# Patient Record
Sex: MALE | Race: WHITE | NOT HISPANIC OR LATINO | Employment: FULL TIME | URBAN - METROPOLITAN AREA
[De-identification: names, ages, dates, MRNs, and addresses within clinical notes are randomized per-mention and may not be internally consistent; named-entity substitution may affect disease eponyms.]

---

## 2017-03-21 ENCOUNTER — ALLSCRIPTS OFFICE VISIT (OUTPATIENT)
Dept: OTHER | Facility: OTHER | Age: 37
End: 2017-03-21

## 2017-08-09 ENCOUNTER — TRANSCRIBE ORDERS (OUTPATIENT)
Dept: ADMINISTRATIVE | Facility: HOSPITAL | Age: 37
End: 2017-08-09

## 2017-08-09 ENCOUNTER — APPOINTMENT (OUTPATIENT)
Dept: LAB | Facility: HOSPITAL | Age: 37
End: 2017-08-09
Payer: COMMERCIAL

## 2017-08-09 DIAGNOSIS — Z00.8 HEALTH EXAMINATION IN POPULATION SURVEY: ICD-10-CM

## 2017-08-09 DIAGNOSIS — Z00.8 HEALTH EXAMINATION IN POPULATION SURVEY: Primary | ICD-10-CM

## 2017-08-09 LAB
CHOLEST SERPL-MCNC: 201 MG/DL (ref 50–200)
EST. AVERAGE GLUCOSE BLD GHB EST-MCNC: 117 MG/DL
HBA1C MFR BLD: 5.7 % (ref 4.2–6.3)
HDLC SERPL-MCNC: 60 MG/DL (ref 40–60)
LDLC SERPL CALC-MCNC: 132 MG/DL (ref 0–100)
TRIGL SERPL-MCNC: 44 MG/DL

## 2017-08-09 PROCEDURE — 80061 LIPID PANEL: CPT

## 2017-08-09 PROCEDURE — 36415 COLL VENOUS BLD VENIPUNCTURE: CPT

## 2017-08-09 PROCEDURE — 83036 HEMOGLOBIN GLYCOSYLATED A1C: CPT

## 2017-11-08 ENCOUNTER — ALLSCRIPTS OFFICE VISIT (OUTPATIENT)
Dept: OTHER | Facility: OTHER | Age: 37
End: 2017-11-08

## 2018-01-14 VITALS
DIASTOLIC BLOOD PRESSURE: 80 MMHG | HEIGHT: 74 IN | WEIGHT: 200 LBS | BODY MASS INDEX: 25.67 KG/M2 | SYSTOLIC BLOOD PRESSURE: 132 MMHG

## 2018-01-18 NOTE — PROGRESS NOTES
Assessment    1  Encounter for preventive health examination (V70 0) (Z00 00)   2  Kidney cysts (753 10) (D35 7)    Plan  Folliculitis    · Mupirocin Calcium 2 % External Cream; APPLY THIN LAYER TO AFFECTED  AREA(S) 3 TIMES DAILY   · Sulfamethoxazole-Trimethoprim 800-160 MG Oral Tablet (Bactrim DS); TAKE 1  TABLET TWICE DAILY    Discussion/Summary  Currently, he eats a healthy diet and has an adequate exercise regimen  Prostate cancer screening: PSA is not indicated  Testicular cancer screening: the risks and benefits of testicular cancer screening were discussed and self testicular exam technique was taught  Colorectal cancer screening: colorectal cancer screening is not indicated  Screening lab work includes kenyetta completed  The risks and benefits of immunizations were discussed  He was advised to be evaluated by a dentist  Advice and education were given regarding aerobic exercise, seat belt use and advanced directive planning  Patient discussion: discussed with the patient  Current on screens  The treatment plan was reviewed with the patient/guardian  The patient/guardian understands and agrees with the treatment plan      Chief Complaint  Health maintenance      History of Present Illness  HM, Adult Male: The patient is being seen for a health maintenance evaluation  Social History: Household members include spouse  He is   Work status: working full time and occupation: Nurse  The patient is a former cigarette smoker and quit smoking 12/2015  He reports never drinking alcohol  He has never used illicit drugs  General Health: The patient's health since the last visit is described as good  He has regular dental visits  He denies vision problems  He denies hearing loss  Immunizations status: up to date  Lifestyle:  He consumes a diverse and healthy diet  He does not have any weight concerns  He exercises regularly  He does not use tobacco  He denies alcohol use  He denies drug use  Reproductive health:  the patient is sexually active  He denies erectile dysfunction  Screening: Active Problems    1  Acne (706 1) (L70 9)   2  Kidney cysts (753 10) (N28 1)   3  Liver hemangioma (228 04) (D18 03)   4  Nicotine dependence (305 1) (F17 200)   5  Periorbital dermatitis (692 9) (L30 9)    Past Medical History    · History of Alcohol Abuse - In Remission (305 03)    Family History  Mother    · No pertinent family history    Social History    · Never a smoker    Current Meds   1  Minocycline HCl - 100 MG Oral Capsule; TAKE 1 CAPSULE DAILY WITH FOOD; Therapy: [id number] to (Evaluate:23Apr2017)  Requested for: 26Oct2016; Last   Rx:25Oct2016 Ordered    Allergies    1  [address] [place] [address]   Recorded: [id number] 07:17AM   Heart Rate 72   Respiration 16   Systolic 502   Diastolic 72   Height 6 ft 2 in   Weight 200 lb    BMI Calculated 25 68   BSA Calculated 2 17     Physical Exam    Constitutional   General appearance: No acute distress, well appearing and well nourished  Head and Face   Head and face: Normal     Palpation of the face and sinuses: No sinus tenderness  Eyes   Pupils and irises: Equal, round, reactive to light  Ears, Nose, Mouth, and Throat   Nasal mucosa, septum, and turbinates: Normal without edema or erythema  Lips, teeth, and gums: Normal, good dentition  Oropharynx: Normal with no erythema, edema, exudate or lesions  Neck   Neck: Supple, symmetric, trachea midline, no masses  Thyroid: Normal, no thyromegaly  Pulmonary   Respiratory effort: No increased work of breathing or signs of respiratory distress  Auscultation of lungs: Clear to auscultation  Cardiovascular   Auscultation of heart: Normal rate and rhythm, normal S1 and S2, no murmurs  Carotid pulses: 2+ bilaterally  Lymphatic   Palpation of lymph nodes in neck: No lymphadenopathy      Psychiatric   Orientation to person, place and time: Normal     Mood and affect: Normal  Results/Data  PHQ-2 Adult Depression Screening [id number] 07:25AM UserRenato     Test Name Result Flag Reference   PHQ-2 Adult Depression Score 0     Over the last two weeks, how often have you been bothered by any of the following problems? Little interest or pleasure in doing things: Not at all - 0  Feeling down, depressed, or hopeless: Not at all - 0   PHQ-2 Adult Depression Screening Negative       (1) LIPID PANEL, FASTING 09Aug2017 02:17PM Xin Sorensen     Test Name Result Flag Reference   CHOLESTEROL 201 mg/dL H    HDL,DIRECT 60 mg/dL  40-60   Specimen collection should occur prior to Metamizole administration due to the potential for falsley depressed results  LDL CHOLESTEROL CALCULATED 132 mg/dL H 0-100   This is a fasting blood test  Water,black tea or black  coffee only after 9:00pm the night before test  Drink 2 glasses of water the morning of test         Triglyceride:        Normal ??? ??? ??? ??? ??? ??? ??? <150 mg/dl   ??? ??? ???Borderline High ??? ??? 150-199 mg/dl   ??? ??? ? ?? High ??? ??? ??? ??? ??? ??? ??? 200-499 mg/dl   ??? ??? ? ??Very High ??? ??? ??? ??? ??? >499 mg/dl      Cholesterol:       Desirable ??? ??? ??? ??? <200 mg/dl   ??? ??? Borderline High ??? 200-239 mg/dl   ??? ??? High ??? ??? ??? ??? ??? ??? >239 mg/dl      HDL Cholesterol:       High ??? ???>59 mg/dL   ??? ??? Low ??? ??? <41 mg/dL      This screening LDL is a calculated result  It does not have the accuracy of the Direct Measured LDL in the monitoring of patients with hyperlipidemia and/or statin therapy  Direct Measure LDL (XDA642) must be ordered separately in these patients  TRIGLYCERIDES 44 mg/dL  <=150   Specimen collection should occur prior to N-Acetylcysteine or Metamizole administration due to the potential for falsely depressed results  (1) HEMOGLOBIN A1C 09Aug2017 02:17PM Xin Sorensen     Test Name Result Flag Reference   HEMOGLOBIN A1C 5 7 %  4 2-6 3   EST  AVG   GLUCOSE 117 mg/dl Signatures   Electronically signed by : Brooks Springer MD; Nov 8 2017  9:06AM EST                       (Author)

## 2018-01-22 VITALS
DIASTOLIC BLOOD PRESSURE: 72 MMHG | HEART RATE: 72 BPM | HEIGHT: 74 IN | RESPIRATION RATE: 16 BRPM | BODY MASS INDEX: 25.67 KG/M2 | WEIGHT: 200 LBS | SYSTOLIC BLOOD PRESSURE: 128 MMHG

## 2018-06-26 DIAGNOSIS — Z00.00 ENCOUNTER FOR WELLNESS EXAMINATION IN ADULT: Primary | ICD-10-CM

## 2018-06-26 DIAGNOSIS — D18.03 HEMANGIOMA OF LIVER: ICD-10-CM

## 2018-07-02 ENCOUNTER — OFFICE VISIT (OUTPATIENT)
Dept: FAMILY MEDICINE CLINIC | Facility: CLINIC | Age: 38
End: 2018-07-02
Payer: COMMERCIAL

## 2018-07-02 DIAGNOSIS — H60.00: Primary | ICD-10-CM

## 2018-07-02 PROCEDURE — 10060 I&D ABSCESS SIMPLE/SINGLE: CPT | Performed by: FAMILY MEDICINE

## 2018-07-02 PROCEDURE — 87070 CULTURE OTHR SPECIMN AEROBIC: CPT | Performed by: FAMILY MEDICINE

## 2018-07-02 PROCEDURE — 87205 SMEAR GRAM STAIN: CPT | Performed by: FAMILY MEDICINE

## 2018-07-03 ENCOUNTER — TELEPHONE (OUTPATIENT)
Dept: FAMILY MEDICINE CLINIC | Facility: CLINIC | Age: 38
End: 2018-07-03

## 2018-07-03 ENCOUNTER — LAB (OUTPATIENT)
Dept: LAB | Facility: HOSPITAL | Age: 38
End: 2018-07-03
Payer: COMMERCIAL

## 2018-07-03 DIAGNOSIS — Z00.00 ENCOUNTER FOR WELLNESS EXAMINATION IN ADULT: ICD-10-CM

## 2018-07-03 DIAGNOSIS — D18.03 HEMANGIOMA OF LIVER: ICD-10-CM

## 2018-07-03 LAB
ALBUMIN SERPL BCP-MCNC: 4.1 G/DL (ref 3.5–5)
ALP SERPL-CCNC: 58 U/L (ref 46–116)
ALT SERPL W P-5'-P-CCNC: 24 U/L (ref 12–78)
ANION GAP SERPL CALCULATED.3IONS-SCNC: 5 MMOL/L (ref 4–13)
AST SERPL W P-5'-P-CCNC: 15 U/L (ref 5–45)
BASOPHILS # BLD AUTO: 0.03 THOUSANDS/ΜL (ref 0–0.1)
BASOPHILS NFR BLD AUTO: 1 % (ref 0–1)
BILIRUB SERPL-MCNC: 0.4 MG/DL (ref 0.2–1)
BUN SERPL-MCNC: 13 MG/DL (ref 5–25)
CALCIUM SERPL-MCNC: 9.4 MG/DL (ref 8.3–10.1)
CHLORIDE SERPL-SCNC: 105 MMOL/L (ref 100–108)
CHOLEST SERPL-MCNC: 206 MG/DL (ref 50–200)
CO2 SERPL-SCNC: 31 MMOL/L (ref 21–32)
CREAT SERPL-MCNC: 1.08 MG/DL (ref 0.6–1.3)
EOSINOPHIL # BLD AUTO: 0.11 THOUSAND/ΜL (ref 0–0.61)
EOSINOPHIL NFR BLD AUTO: 2 % (ref 0–6)
ERYTHROCYTE [DISTWIDTH] IN BLOOD BY AUTOMATED COUNT: 12.7 % (ref 11.6–15.1)
EST. AVERAGE GLUCOSE BLD GHB EST-MCNC: 111 MG/DL
GFR SERPL CREATININE-BSD FRML MDRD: 88 ML/MIN/1.73SQ M
GLUCOSE P FAST SERPL-MCNC: 86 MG/DL (ref 65–99)
HBA1C MFR BLD: 5.5 % (ref 4.2–6.3)
HCT VFR BLD AUTO: 41 % (ref 36.5–49.3)
HDLC SERPL-MCNC: 65 MG/DL (ref 40–60)
HGB BLD-MCNC: 13.6 G/DL (ref 12–17)
IMM GRANULOCYTES # BLD AUTO: 0.01 THOUSAND/UL (ref 0–0.2)
IMM GRANULOCYTES NFR BLD AUTO: 0 % (ref 0–2)
LDLC SERPL CALC-MCNC: 133 MG/DL (ref 0–100)
LYMPHOCYTES # BLD AUTO: 2.39 THOUSANDS/ΜL (ref 0.6–4.47)
LYMPHOCYTES NFR BLD AUTO: 46 % (ref 14–44)
MCH RBC QN AUTO: 29.8 PG (ref 26.8–34.3)
MCHC RBC AUTO-ENTMCNC: 33.2 G/DL (ref 31.4–37.4)
MCV RBC AUTO: 90 FL (ref 82–98)
MONOCYTES # BLD AUTO: 0.3 THOUSAND/ΜL (ref 0.17–1.22)
MONOCYTES NFR BLD AUTO: 6 % (ref 4–12)
NEUTROPHILS # BLD AUTO: 2.38 THOUSANDS/ΜL (ref 1.85–7.62)
NEUTS SEG NFR BLD AUTO: 45 % (ref 43–75)
NONHDLC SERPL-MCNC: 141 MG/DL
NRBC BLD AUTO-RTO: 0 /100 WBCS
PLATELET # BLD AUTO: 260 THOUSANDS/UL (ref 149–390)
PMV BLD AUTO: 9.3 FL (ref 8.9–12.7)
POTASSIUM SERPL-SCNC: 4.2 MMOL/L (ref 3.5–5.3)
PROT SERPL-MCNC: 7.4 G/DL (ref 6.4–8.2)
RBC # BLD AUTO: 4.57 MILLION/UL (ref 3.88–5.62)
SODIUM SERPL-SCNC: 141 MMOL/L (ref 136–145)
TRIGL SERPL-MCNC: 39 MG/DL
WBC # BLD AUTO: 5.22 THOUSAND/UL (ref 4.31–10.16)

## 2018-07-03 PROCEDURE — 83036 HEMOGLOBIN GLYCOSYLATED A1C: CPT

## 2018-07-03 PROCEDURE — 85025 COMPLETE CBC W/AUTO DIFF WBC: CPT

## 2018-07-03 PROCEDURE — 80053 COMPREHEN METABOLIC PANEL: CPT

## 2018-07-03 PROCEDURE — 80061 LIPID PANEL: CPT

## 2018-07-03 PROCEDURE — 36415 COLL VENOUS BLD VENIPUNCTURE: CPT

## 2018-07-04 LAB
BACTERIA WND AEROBE CULT: NORMAL
GRAM STN SPEC: NORMAL
GRAM STN SPEC: NORMAL

## 2018-07-05 ENCOUNTER — TELEPHONE (OUTPATIENT)
Dept: FAMILY MEDICINE CLINIC | Facility: CLINIC | Age: 38
End: 2018-07-05

## 2018-07-05 NOTE — TELEPHONE ENCOUNTER
Patient notified as per Dr Casie Hoover  [place] states he is doing awesome, healing up nicely! Advised to call office if any reccurence        ----- Message from Magda Merino MD sent at 7/5/2018  6:34 AM EDT -----  No evid MRSA on C/S---ck progress of ear abscess

## 2018-07-12 ENCOUNTER — OFFICE VISIT (OUTPATIENT)
Dept: FAMILY MEDICINE CLINIC | Facility: CLINIC | Age: 38
End: 2018-07-12
Payer: COMMERCIAL

## 2018-07-12 VITALS
BODY MASS INDEX: 23.87 KG/M2 | SYSTOLIC BLOOD PRESSURE: 116 MMHG | WEIGHT: 186 LBS | HEIGHT: 74 IN | HEART RATE: 76 BPM | OXYGEN SATURATION: 98 % | DIASTOLIC BLOOD PRESSURE: 70 MMHG

## 2018-07-12 DIAGNOSIS — L73.9 FOLLICULITIS: Primary | ICD-10-CM

## 2018-07-12 PROCEDURE — 99213 OFFICE O/P EST LOW 20 MIN: CPT | Performed by: FAMILY MEDICINE

## 2018-07-12 RX ORDER — MUPIROCIN CALCIUM 20 MG/G
CREAM TOPICAL 3 TIMES DAILY
COMMUNITY
Start: 2017-11-08 | End: 2019-01-17 | Stop reason: ALTCHOICE

## 2018-07-12 RX ORDER — MINOCYCLINE HYDROCHLORIDE 100 MG/1
1 CAPSULE ORAL DAILY
COMMUNITY
Start: 2015-11-05 | End: 2018-07-12 | Stop reason: HOSPADM

## 2018-07-12 RX ORDER — SULFAMETHOXAZOLE AND TRIMETHOPRIM 800; 160 MG/1; MG/1
1 TABLET ORAL EVERY 12 HOURS SCHEDULED
COMMUNITY
End: 2022-03-21 | Stop reason: SDUPTHER

## 2018-07-12 NOTE — PROGRESS NOTES
[address] [facility]         NAME: Sarah Franco is a 41 y o  male  : 1980    MRN: [id number]  DATE: 2018  TIME: 10:26 AM    Assessment and Plan   Folliculitis [G07 1]  1  Folliculitis           Patient Instructions     Patient Instructions   Cont same meds          Chief Complaint     Chief Complaint   Patient presents with    [facility] wellness         History of Present Illness       F/u ear abscess--stable        Review of Systems   Review of Systems   Constitutional: Negative for appetite change, chills, diaphoresis and fever  HENT: Negative for ear pain, rhinorrhea, sinus pressure and sore throat  Eyes: Negative for discharge, redness and itching  Respiratory: Negative for cough, shortness of breath and wheezing  Cardiovascular: Negative for chest pain and palpitations  Gastrointestinal: Negative for abdominal pain, diarrhea, nausea and vomiting  Current Medications       Current Outpatient Prescriptions:     mupirocin (BACTROBAN) 2 % cream, Apply topically 3 (three) times a day, Disp: , Rfl:     sulfamethoxazole-trimethoprim (BACTRIM DS) 800-160 mg per tablet, Take 1 tablet by mouth every 12 (twelve) hours, Disp: , Rfl:     Creatine POWD, Take 5 g by mouth daily  , Disp: , Rfl:     Multiple Vitamins-Minerals (MULTIVITAMIN WITH MINERALS) tablet, Take 1 tablet by mouth daily  , Disp: , Rfl:     Current Allergies     Allergies as of 2018 - Reviewed 2018   Allergen Reaction Noted    Ceclor [cefaclor] Anaphylaxis 2016    Cephalosporins Anaphylaxis 2016    Shellfish-derived products  2016            The following portions of the patient's history were reviewed and updated as appropriate: allergies, current medications, past family history, past medical history, past social history, past surgical history and problem list      Past Medical History:   Diagnosis Date    Alcohol abuse, in remission Onset: 18May 2009  No past surgical history on file  Family History   Problem Relation Age of Onset    No Known Problems Mother          Medications have been verified  Objective   /70   Pulse 76   Ht 6' 2" (1 88 m)   Wt 84 4 kg (186 lb)   SpO2 98%   BMI 23 88 kg/m²        Physical Exam     Physical Exam   Constitutional: He appears well-developed and well-nourished  No distress  HENT:   Right Ear: Tympanic membrane, external ear and ear canal normal  Tympanic membrane is not injected  Left Ear: Tympanic membrane, external ear and ear canal normal  Tympanic membrane is not injected  Nose: Nose normal    Mouth/Throat: Uvula is midline, oropharynx is clear and moist and mucous membranes are normal    Eyes: Conjunctivae are normal  Pupils are equal, round, and reactive to light  Neck: Normal range of motion  Neck supple  No thyromegaly present  Cardiovascular: Normal rate, regular rhythm and normal heart sounds  No murmur heard  Pulmonary/Chest: Effort normal and breath sounds normal  No respiratory distress  He has no wheezes  Lymphadenopathy:     He has no cervical adenopathy  Skin: He is not diaphoretic

## 2018-07-20 ENCOUNTER — OFFICE VISIT (OUTPATIENT)
Dept: FAMILY MEDICINE CLINIC | Facility: CLINIC | Age: 38
End: 2018-07-20
Payer: COMMERCIAL

## 2018-07-20 VITALS
SYSTOLIC BLOOD PRESSURE: 116 MMHG | BODY MASS INDEX: 23.87 KG/M2 | DIASTOLIC BLOOD PRESSURE: 74 MMHG | WEIGHT: 186 LBS | HEART RATE: 77 BPM | OXYGEN SATURATION: 98 % | HEIGHT: 74 IN

## 2018-07-20 DIAGNOSIS — H61.23 CERUMEN DEBRIS ON TYMPANIC MEMBRANE OF BOTH EARS: Primary | ICD-10-CM

## 2018-07-20 DIAGNOSIS — H60.551 ACUTE REACTIVE OTITIS EXTERNA OF RIGHT EAR: ICD-10-CM

## 2018-07-20 PROCEDURE — 3008F BODY MASS INDEX DOCD: CPT | Performed by: FAMILY MEDICINE

## 2018-07-20 PROCEDURE — 69209 REMOVE IMPACTED EAR WAX UNI: CPT | Performed by: FAMILY MEDICINE

## 2018-07-20 PROCEDURE — 99213 OFFICE O/P EST LOW 20 MIN: CPT | Performed by: FAMILY MEDICINE

## 2018-07-20 PROCEDURE — 1036F TOBACCO NON-USER: CPT | Performed by: FAMILY MEDICINE

## 2018-07-20 NOTE — PROGRESS NOTES
[address] [facility]         NAME: Michelle Austin is a 41 y o  male  : 1980    MRN: [id number]  DATE: 2018  TIME: 9:33 AM    Assessment and Plan   Cerumen debris on tympanic membrane of both ears [H61 23]  1  Cerumen debris on tympanic membrane of both ears     2  Acute reactive otitis externa of right ear           Patient Instructions     Patient Instructions   Avoid Q-tip use in the area  With the wax out the year should be less itchy  Recheck as needed for periodic wax removal   Home remedies were discussed  flushing-wax removed with irrigation without incident  Tympanic membranes were intact  Mild canal inflammation  Chief Complaint     Chief Complaint   Patient presents with    Cerumen Impaction     both ears         History of Present Illness       Patient is a 41year old male who presents c/o R sided ear fullness and worsening of tinnitus x couple of days  Pt reports he has been wearing ear plugs the last few days and thinks that has caused symptoms  Denies Qtip use or other FB aside from ear plugs  He reports some mild left ear fullness as well  Review of Systems   Review of Systems   Constitutional: Negative for chills and fever  HENT: Positive for hearing loss (decreased hearing)  Negative for ear discharge, ear pain, sinus pain and sinus pressure  Current Medications       Current Outpatient Prescriptions:     Creatine POWD, Take 5 g by mouth daily  , Disp: , Rfl:     Multiple Vitamins-Minerals (MULTIVITAMIN WITH MINERALS) tablet, Take 1 tablet by mouth daily  , Disp: , Rfl:     mupirocin (BACTROBAN) 2 % cream, Apply topically 3 (three) times a day, Disp: , Rfl:     sulfamethoxazole-trimethoprim (BACTRIM DS) 800-160 mg per tablet, Take 1 tablet by mouth every 12 (twelve) hours, Disp: , Rfl:     Current Allergies     Allergies as of 2018 - Reviewed 2018   Allergen Reaction Noted    Ceclor [cefaclor] Anaphylaxis 02/05/2016    Cephalosporins Anaphylaxis 02/05/2016    Shellfish-derived products  02/05/2016            The following portions of the patient's history were reviewed and updated as appropriate: allergies, current medications, past family history, past medical history, past social history, past surgical history and problem list      Past Medical History:   Diagnosis Date    Alcohol abuse, in remission     Onset: 18May 2009  History reviewed  No pertinent surgical history  Family History   Problem Relation Age of Onset    No Known Problems Mother          Medications have been verified  Objective   /74   Pulse 77   Ht 6' 2" (1 88 m)   Wt 84 4 kg (186 lb)   SpO2 98%   BMI 23 88 kg/m²        Physical Exam     Physical Exam   Constitutional: He appears well-developed and well-nourished  No distress  HENT:   Head: Normocephalic and atraumatic  Right Ear: External ear normal    Left Ear: External ear normal    Mouth/Throat: Oropharynx is clear and moist    Moderate cerumen impaction noted b/l   Eyes: Pupils are equal, round, and reactive to light  Neck: Normal range of motion  Neck supple

## 2018-07-20 NOTE — PATIENT INSTRUCTIONS
Avoid Q-tip use in the area  With the wax out the year should be less itchy  Recheck as needed for periodic wax removal   Home remedies were discussed

## 2018-11-14 ENCOUNTER — IMMUNIZATION (OUTPATIENT)
Dept: FAMILY MEDICINE CLINIC | Facility: CLINIC | Age: 38
End: 2018-11-14
Payer: COMMERCIAL

## 2018-11-14 DIAGNOSIS — Z23 ENCOUNTER FOR IMMUNIZATION: ICD-10-CM

## 2018-11-14 PROCEDURE — 90686 IIV4 VACC NO PRSV 0.5 ML IM: CPT

## 2018-11-14 PROCEDURE — 90471 IMMUNIZATION ADMIN: CPT

## 2019-01-17 ENCOUNTER — OFFICE VISIT (OUTPATIENT)
Dept: FAMILY MEDICINE CLINIC | Facility: CLINIC | Age: 39
End: 2019-01-17
Payer: COMMERCIAL

## 2019-01-17 VITALS
DIASTOLIC BLOOD PRESSURE: 82 MMHG | BODY MASS INDEX: 23.74 KG/M2 | TEMPERATURE: 98.9 F | HEART RATE: 67 BPM | WEIGHT: 185 LBS | SYSTOLIC BLOOD PRESSURE: 126 MMHG | OXYGEN SATURATION: 98 % | HEIGHT: 74 IN

## 2019-01-17 DIAGNOSIS — J01.00 ACUTE NON-RECURRENT MAXILLARY SINUSITIS: Primary | ICD-10-CM

## 2019-01-17 PROCEDURE — 99213 OFFICE O/P EST LOW 20 MIN: CPT | Performed by: FAMILY MEDICINE

## 2019-01-17 PROCEDURE — 3008F BODY MASS INDEX DOCD: CPT | Performed by: FAMILY MEDICINE

## 2019-01-17 RX ORDER — AZITHROMYCIN 250 MG/1
250 TABLET, FILM COATED ORAL EVERY 24 HOURS
Qty: 6 TABLET | Refills: 0 | Status: SHIPPED | OUTPATIENT
Start: 2019-01-17 | End: 2019-01-22

## 2019-01-19 NOTE — PROGRESS NOTES
[facility]        NAME: Gilles Cruz is a 42 y o  male  : 1980    MRN: [id number]  DATE: 2019  TIME: 8:52 AM    Assessment and Plan   Acute non-recurrent maxillary sinusitis [J01 00]  1  Acute non-recurrent maxillary sinusitis  azithromycin (ZITHROMAX) 250 mg tablet         Patient Instructions     Patient Instructions   z pk if Sx worse          Chief Complaint     Chief Complaint   Patient presents with    Sore Throat     fever, losing voice for 5--6 days         History of Present Illness       C/o ST/myalgias sev days      Sore Throat    Pertinent negatives include no abdominal pain, congestion, diarrhea, shortness of breath or vomiting  Review of Systems   Review of Systems   Constitutional: Positive for chills and fever  Negative for fatigue and unexpected weight change  HENT: Positive for postnasal drip and sore throat  Negative for congestion and sinus pressure  Eyes: Negative for visual disturbance  Respiratory: Negative for shortness of breath and wheezing  Cardiovascular: Negative for chest pain and palpitations  Gastrointestinal: Negative for abdominal pain, diarrhea, nausea and vomiting  Current Medications       Current Outpatient Prescriptions:     Creatine POWD, Take 5 g by mouth daily  , Disp: , Rfl:     Multiple Vitamins-Minerals (MULTIVITAMIN WITH MINERALS) tablet, Take 1 tablet by mouth daily  , Disp: , Rfl:     sulfamethoxazole-trimethoprim (BACTRIM DS) 800-160 mg per tablet, Take 1 tablet by mouth every 12 (twelve) hours, Disp: , Rfl:     azithromycin (ZITHROMAX) 250 mg tablet, Take 1 tablet (250 mg total) by mouth every 24 hours for 5 days 2 tabs Day 1, then 1 tab daily X 4 days, Disp: 6 tablet, Rfl: 0    Current Allergies     Allergies as of 2019 - Reviewed 2019   Allergen Reaction Noted    Ceclor [cefaclor] Anaphylaxis 2016    Cephalosporins Anaphylaxis 2016    Shellfish-derived products  02/05/2016            The following portions of the patient's history were reviewed and updated as appropriate: allergies, current medications, past family history, past medical history, past social history, past surgical history and problem list      Past Medical History:   Diagnosis Date    Alcohol abuse, in remission     Onset: 18May 2009  No past surgical history on file  Family History   Problem Relation Age of Onset    No Known Problems Mother          Medications have been verified  Objective   /82   Pulse 67   Temp 98 9 °F (37 2 °C)   Ht 6' 2" (1 88 m)   Wt 83 9 kg (185 lb)   SpO2 98%   BMI 23 75 kg/m²        Physical Exam     Physical Exam   HENT:   Right Ear: External ear normal    Left Ear: External ear normal    Mouth/Throat: No oropharyngeal exudate  Sinus red/swollen bilat----throat very red--no exudate   Eyes: Conjunctivae are normal    Neck: Normal range of motion  Neck supple  Cardiovascular: Normal heart sounds  Pulmonary/Chest: Breath sounds normal    Lymphadenopathy:     He has no cervical adenopathy

## 2019-07-14 ENCOUNTER — HOSPITAL ENCOUNTER (EMERGENCY)
Facility: HOSPITAL | Age: 39
Discharge: HOME | End: 2019-07-14
Attending: EMERGENCY MEDICINE | Admitting: EMERGENCY MEDICINE
Payer: OTHER MISCELLANEOUS

## 2019-07-14 VITALS
SYSTOLIC BLOOD PRESSURE: 112 MMHG | RESPIRATION RATE: 16 BRPM | WEIGHT: 185 LBS | BODY MASS INDEX: 23.74 KG/M2 | TEMPERATURE: 97.5 F | OXYGEN SATURATION: 99 % | HEART RATE: 66 BPM | HEIGHT: 74 IN | DIASTOLIC BLOOD PRESSURE: 56 MMHG

## 2019-07-14 DIAGNOSIS — S29.012A STRAIN OF THORACIC BACK REGION: Primary | ICD-10-CM

## 2019-07-14 PROCEDURE — 99283 EMERGENCY DEPT VISIT LOW MDM: CPT

## 2019-07-14 PROCEDURE — 99283 EMERGENCY DEPT VISIT LOW MDM: CPT | Performed by: PHYSICIAN ASSISTANT

## 2019-07-14 RX ORDER — METHOCARBAMOL 500 MG/1
500 TABLET, FILM COATED ORAL 3 TIMES DAILY
Qty: 15 TABLET | Refills: 0 | Status: SHIPPED | OUTPATIENT
Start: 2019-07-14

## 2019-07-15 NOTE — ED PROVIDER NOTES
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  The patient was on a backboard and lifted from the floor to a stretcher  When he was putting him on the stretcher the IV pole was in the way and he had to twist to put the patient on the stretcher  He denies numbness, tingling, weakness, bowel/bladder dysfunction  The pain is worse with twisting to the right  He currently has an icy hot patch on his back which seems to help a little, but as the day goes on his pain is worsening  History provided by:  Patient  Back Pain   Location:  Thoracic spine  Quality:  Aching  Radiates to:  Does not radiate  Pain severity:  Moderate  Onset quality:  Gradual  Duration:  2 days  Timing:  Constant  Progression:  Worsening  Chronicity:  New  Context: lifting heavy objects    Relieved by:  Being still  Worsened by: Movement and twisting  Ineffective treatments: icy hot patch  Associated symptoms: no abdominal pain, no abdominal swelling, no bladder incontinence, no bowel incontinence, no chest pain, no fever, no leg pain, no numbness, no perianal numbness and no weakness    Risk factors: no hx of cancer, no hx of osteoporosis and not obese        Prior to Admission Medications   Prescriptions Last Dose Informant Patient Reported? Taking? Creatine POWD Past Week at Unknown time  Yes Yes   Sig: Take 5 g by mouth daily  Multiple Vitamins-Minerals (MULTIVITAMIN WITH MINERALS) tablet Past Week at Unknown time  Yes Yes   Sig: Take 1 tablet by mouth daily     sulfamethoxazole-trimethoprim (BACTRIM DS) 800-160 mg per tablet Past Week at Unknown time  Yes Yes   Sig: Take 1 tablet by mouth every 12 (twelve) hours      Facility-Administered Medications: None       Past Medical History:   Diagnosis Date    Alcohol abuse, in remission     Onset: 18May 2009  History reviewed  No pertinent surgical history  Family History   Problem Relation Age of Onset    No Known Problems Mother      I have reviewed and agree with the history as documented  Social History     Tobacco Use    Smoking status: Former Smoker    Smokeless tobacco: Former User     Quit date: 2/5/2015    Tobacco comment: Per [employer]: Never Smoker   Substance Use Topics    Alcohol use: No     Comment: Per [employer]: Alcohol Abuse in remission - Onset Date [id number]    Drug use: No        Review of Systems   Constitutional: Negative for chills and fever  Cardiovascular: Negative for chest pain  Gastrointestinal: Negative for abdominal pain and bowel incontinence  Genitourinary: Negative for bladder incontinence  Musculoskeletal: Positive for back pain  Skin: Negative for color change and rash  Neurological: Negative for weakness and numbness  All other systems reviewed and are negative  Physical Exam  Physical Exam   Constitutional: He is oriented to person, place, and time  He appears well-developed and well-nourished  He is cooperative  He does not appear ill  No distress  HENT:   Head: Normocephalic and atraumatic  Eyes: Conjunctivae are normal    Neck: Normal range of motion  No spinous process tenderness and no muscular tenderness present  Cardiovascular: Normal rate, regular rhythm and normal heart sounds  Pulmonary/Chest: Effort normal and breath sounds normal    Musculoskeletal:        Thoracic back: He exhibits tenderness (left paraspinal muscles of thoracic spine  )  He exhibits normal range of motion, no bony tenderness, no swelling and no deformity          Back:    Neurological: He is alert and oriented to person, place, and time  He has normal strength  No sensory deficit  Gait normal    Skin: Skin is warm and dry  No rash noted  He is not diaphoretic  No pallor  Nursing note and vitals reviewed  Vital Signs  ED Triage Vitals [07/14/19 2105]   Temperature Pulse Respirations Blood Pressure SpO2   97 5 °F (36 4 °C) 66 16 112/56 99 %      Temp src Heart Rate Source Patient Position - Orthostatic VS BP Location FiO2 (%)   -- Monitor Sitting Left arm --      Pain Score       3           Vitals:    07/14/19 2105   BP: 112/56   Pulse: 66   Patient Position - Orthostatic VS: Sitting         Visual Acuity      ED Medications  Medications - No data to display    Diagnostic Studies  Results Reviewed     None                 No orders to display              Procedures  Procedures       ED Course                               MDM  Number of Diagnoses or Management Options  Strain of thoracic back region: new and does not require workup  Diagnosis management comments: Patient with strain of thoracic back, no radicular symptoms, most likely muscular, advised rest and f/u with occumed for recheck  Patient Progress  Patient progress: stable      Disposition  Final diagnoses:   Strain of thoracic back region     Time reflects when diagnosis was documented in both MDM as applicable and the Disposition within this note     Time User Action Codes Description Comment    7/14/2019  9:29 PM Ivin Doctor Add [S29 012A] Strain of thoracic back region       ED Disposition     ED Disposition Condition Date/Time Comment    Discharge Stable Sun Jul 14, 2019  9:29 PM Onita Face discharge to home/self care              Follow-up Information     Follow up With Specialties Details Why Contact Info Additional Information    [address] Now [facility] In 2 days For recheck [address] [address]  [address] [place], [address]          Discharge Medication List as of 7/14/2019  9:31 PM      START taking these medications    Details   methocarbamol (ROBAXIN) 500 mg tablet Take 1 tablet (500 mg total) by mouth 3 (three) times a day, Starting Sun 7/14/2019, Print         CONTINUE these medications which have NOT CHANGED    Details   Creatine POWD Take 5 g by mouth daily  , Until Discontinued, Historical Med      Multiple Vitamins-Minerals (MULTIVITAMIN WITH MINERALS) tablet Take 1 tablet by mouth daily  , Until Discontinued, Historical Med      sulfamethoxazole-trimethoprim (BACTRIM DS) 800-160 mg per tablet Take 1 tablet by mouth every 12 (twelve) hours, Historical Med           No discharge procedures on file      ED Provider  Electronically Signed by           Cathy Castillo PA-C  07/14/19 3032

## 2019-07-15 NOTE — DISCHARGE INSTRUCTIONS
Rest, ice for next 2 days, heat after 2 days  Take motrin 600mg every 6 hours as needed for pain  Robaxin 3 times a day  Follow up with occpatriciad in 2 days for recheck

## 2019-07-16 ENCOUNTER — APPOINTMENT (OUTPATIENT)
Dept: URGENT CARE | Facility: CLINIC | Age: 39
End: 2019-07-16
Payer: OTHER MISCELLANEOUS

## 2019-07-16 PROCEDURE — 99283 EMERGENCY DEPT VISIT LOW MDM: CPT | Performed by: PHYSICIAN ASSISTANT

## 2019-07-16 PROCEDURE — G0382 LEV 3 HOSP TYPE B ED VISIT: HCPCS | Performed by: PHYSICIAN ASSISTANT

## 2019-11-08 ENCOUNTER — IMMUNIZATIONS (OUTPATIENT)
Dept: FAMILY MEDICINE CLINIC | Facility: CLINIC | Age: 39
End: 2019-11-08
Payer: COMMERCIAL

## 2019-11-08 DIAGNOSIS — Z23 NEED FOR VACCINATION: Primary | ICD-10-CM

## 2019-11-08 PROCEDURE — 90686 IIV4 VACC NO PRSV 0.5 ML IM: CPT

## 2019-11-08 PROCEDURE — 90471 IMMUNIZATION ADMIN: CPT

## 2020-06-26 DIAGNOSIS — Z00.00 ENCOUNTER FOR WELLNESS EXAMINATION IN ADULT: Primary | ICD-10-CM

## 2020-07-03 ENCOUNTER — APPOINTMENT (OUTPATIENT)
Dept: LAB | Facility: HOSPITAL | Age: 40
End: 2020-07-03
Payer: COMMERCIAL

## 2020-07-03 DIAGNOSIS — Z00.00 ENCOUNTER FOR WELLNESS EXAMINATION IN ADULT: ICD-10-CM

## 2020-07-03 LAB
ALBUMIN SERPL BCP-MCNC: 4.1 G/DL (ref 3.5–5)
ALP SERPL-CCNC: 60 U/L (ref 46–116)
ALT SERPL W P-5'-P-CCNC: 24 U/L (ref 12–78)
ANION GAP SERPL CALCULATED.3IONS-SCNC: 5 MMOL/L (ref 4–13)
AST SERPL W P-5'-P-CCNC: 18 U/L (ref 5–45)
BILIRUB SERPL-MCNC: 0.48 MG/DL (ref 0.2–1)
BUN SERPL-MCNC: 18 MG/DL (ref 5–25)
CALCIUM SERPL-MCNC: 9.8 MG/DL (ref 8.3–10.1)
CHLORIDE SERPL-SCNC: 107 MMOL/L (ref 100–108)
CHOLEST SERPL-MCNC: 209 MG/DL (ref 50–200)
CO2 SERPL-SCNC: 28 MMOL/L (ref 21–32)
CREAT SERPL-MCNC: 0.89 MG/DL (ref 0.6–1.3)
EST. AVERAGE GLUCOSE BLD GHB EST-MCNC: 103 MG/DL
GFR SERPL CREATININE-BSD FRML MDRD: 108 ML/MIN/1.73SQ M
GLUCOSE P FAST SERPL-MCNC: 85 MG/DL (ref 65–99)
HBA1C MFR BLD: 5.2 %
HDLC SERPL-MCNC: 63 MG/DL
LDLC SERPL CALC-MCNC: 135 MG/DL (ref 0–100)
NONHDLC SERPL-MCNC: 146 MG/DL
POTASSIUM SERPL-SCNC: 4.1 MMOL/L (ref 3.5–5.3)
PROT SERPL-MCNC: 7.4 G/DL (ref 6.4–8.2)
SODIUM SERPL-SCNC: 140 MMOL/L (ref 136–145)
TRIGL SERPL-MCNC: 55 MG/DL

## 2020-07-03 PROCEDURE — 80053 COMPREHEN METABOLIC PANEL: CPT

## 2020-07-03 PROCEDURE — 36415 COLL VENOUS BLD VENIPUNCTURE: CPT

## 2020-07-03 PROCEDURE — 80061 LIPID PANEL: CPT

## 2020-07-03 PROCEDURE — 83036 HEMOGLOBIN GLYCOSYLATED A1C: CPT

## 2020-07-08 ENCOUNTER — OFFICE VISIT (OUTPATIENT)
Dept: FAMILY MEDICINE CLINIC | Facility: CLINIC | Age: 40
End: 2020-07-08
Payer: COMMERCIAL

## 2020-07-08 VITALS
WEIGHT: 176.4 LBS | DIASTOLIC BLOOD PRESSURE: 76 MMHG | OXYGEN SATURATION: 98 % | HEART RATE: 79 BPM | SYSTOLIC BLOOD PRESSURE: 124 MMHG | BODY MASS INDEX: 22.64 KG/M2 | HEIGHT: 74 IN | TEMPERATURE: 97.1 F

## 2020-07-08 DIAGNOSIS — Z00.00 WELLNESS EXAMINATION: Primary | ICD-10-CM

## 2020-07-08 PROCEDURE — 99395 PREV VISIT EST AGE 18-39: CPT | Performed by: FAMILY MEDICINE

## 2020-07-08 NOTE — PROGRESS NOTES
[facility]        NAME: Tawny Muniz is a 47 y o  male  : 1980    MRN: [id number]  DATE: 2020  TIME: 1:03 PM    Assessment and Plan   Wellness examination [B75 16]  6  Wellness examination           Patient Instructions     Patient Instructions   F/u 1 yr            Chief Complaint     Chief Complaint   Patient presents with    Annual Exam     HM         History of Present Illness       Annual exam      Review of Systems   Review of Systems   Constitutional: Negative for fatigue, fever and unexpected weight change  HENT: Negative for congestion, sinus pain and sore throat  Eyes: Negative for visual disturbance  Respiratory: Negative for shortness of breath and wheezing  Cardiovascular: Negative for chest pain and palpitations  Gastrointestinal: Negative for abdominal pain, nausea and vomiting  Musculoskeletal: Negative  Negative for arthralgias and myalgias  Neurological: Negative for syncope, weakness and numbness  Psychiatric/Behavioral: Negative  Negative for confusion, dysphoric mood and suicidal ideas  Current Medications       Current Outpatient Medications:     Creatine POWD, Take 5 g by mouth daily  , Disp: , Rfl:     methocarbamol (ROBAXIN) 500 mg tablet, Take 1 tablet (500 mg total) by mouth 3 (three) times a day, Disp: 15 tablet, Rfl: 0    Multiple Vitamins-Minerals (MULTIVITAMIN WITH MINERALS) tablet, Take 1 tablet by mouth daily  , Disp: , Rfl:     sulfamethoxazole-trimethoprim (BACTRIM DS) 800-160 mg per tablet, Take 1 tablet by mouth every 12 (twelve) hours, Disp: , Rfl:     Current Allergies     Allergies as of 2020 - Reviewed 2020   Allergen Reaction Noted    Ceclor [cefaclor] Anaphylaxis 2016    Cephalosporins Anaphylaxis 2016    Shellfish-derived products  2016            The following portions of the patient's history were reviewed and updated as appropriate: allergies, current medications, past family history, past medical history, past social history, past surgical history and problem list      Past Medical History:   Diagnosis Date    Alcohol abuse, in remission     Onset: 18May 2009  History reviewed  No pertinent surgical history  Family History   Problem Relation Age of Onset    No Known Problems Mother          Medications have been verified  Objective   /76   Pulse 79   Temp (!) 97 1 °F (36 2 °C)   Ht 6' 2" (1 88 m)   Wt 80 kg (176 lb 6 4 oz)   SpO2 98%   BMI 22 65 kg/m²        Physical Exam     Physical Exam   Constitutional: He is oriented to person, place, and time  Vital signs are normal  He appears well-developed and well-nourished  HENT:   Right Ear: Ear canal normal  Tympanic membrane is not injected  Left Ear: Ear canal normal  Tympanic membrane is not injected  Nose: Nose normal    Mouth/Throat: Oropharynx is clear and moist    Eyes: Pupils are equal, round, and reactive to light  Conjunctivae and EOM are normal  Right eye exhibits no discharge  Left eye exhibits no discharge  Neck: Normal range of motion  Neck supple  No thyromegaly present  Cardiovascular: Normal rate, regular rhythm and normal heart sounds  No murmur heard  Pulmonary/Chest: Effort normal and breath sounds normal  No respiratory distress  He has no wheezes  Abdominal: Soft  Bowel sounds are normal  He exhibits no distension  There is no tenderness  Musculoskeletal: Normal range of motion  Lymphadenopathy:     He has no cervical adenopathy  Neurological: He is alert and oriented to person, place, and time  He has normal strength and normal reflexes  He is not disoriented  No sensory deficit  Gait normal    Skin: Skin is warm and dry  Psychiatric: He has a normal mood and affect   His speech is normal and behavior is normal  Judgment and thought content normal  Cognition and memory are normal

## 2020-11-18 ENCOUNTER — CLINICAL SUPPORT (OUTPATIENT)
Dept: FAMILY MEDICINE CLINIC | Facility: CLINIC | Age: 40
End: 2020-11-18
Payer: COMMERCIAL

## 2020-11-18 DIAGNOSIS — Z23 NEED FOR VACCINATION: Primary | ICD-10-CM

## 2020-11-18 PROCEDURE — 90686 IIV4 VACC NO PRSV 0.5 ML IM: CPT

## 2020-11-18 PROCEDURE — 90471 IMMUNIZATION ADMIN: CPT

## 2021-01-05 ENCOUNTER — IMMUNIZATIONS (OUTPATIENT)
Dept: FAMILY MEDICINE CLINIC | Facility: HOSPITAL | Age: 41
End: 2021-01-05

## 2021-01-05 DIAGNOSIS — Z23 ENCOUNTER FOR IMMUNIZATION: ICD-10-CM

## 2021-01-05 PROCEDURE — 0011A SARS-COV-2 / COVID-19 MRNA VACCINE (MODERNA) 100 MCG: CPT

## 2021-01-05 PROCEDURE — 91301 SARS-COV-2 / COVID-19 MRNA VACCINE (MODERNA) 100 MCG: CPT

## 2021-02-07 ENCOUNTER — IMMUNIZATIONS (OUTPATIENT)
Dept: FAMILY MEDICINE CLINIC | Facility: HOSPITAL | Age: 41
End: 2021-02-07

## 2021-02-07 DIAGNOSIS — Z23 ENCOUNTER FOR IMMUNIZATION: Primary | ICD-10-CM

## 2021-02-07 PROCEDURE — 0012A SARS-COV-2 / COVID-19 MRNA VACCINE (MODERNA) 100 MCG: CPT

## 2021-02-07 PROCEDURE — 91301 SARS-COV-2 / COVID-19 MRNA VACCINE (MODERNA) 100 MCG: CPT

## 2021-06-23 ENCOUNTER — OFFICE VISIT (OUTPATIENT)
Dept: FAMILY MEDICINE CLINIC | Facility: CLINIC | Age: 41
End: 2021-06-23
Payer: COMMERCIAL

## 2021-06-23 VITALS
HEIGHT: 74 IN | OXYGEN SATURATION: 97 % | SYSTOLIC BLOOD PRESSURE: 118 MMHG | RESPIRATION RATE: 16 BRPM | WEIGHT: 172 LBS | DIASTOLIC BLOOD PRESSURE: 62 MMHG | BODY MASS INDEX: 22.07 KG/M2 | HEART RATE: 76 BPM

## 2021-06-23 DIAGNOSIS — S76.212A STRAIN OF LEFT GROIN: Primary | ICD-10-CM

## 2021-06-23 DIAGNOSIS — M54.42 LEFT-SIDED LOW BACK PAIN WITH LEFT-SIDED SCIATICA, UNSPECIFIED CHRONICITY: ICD-10-CM

## 2021-06-23 PROCEDURE — 99213 OFFICE O/P EST LOW 20 MIN: CPT | Performed by: NURSE PRACTITIONER

## 2021-06-23 RX ORDER — PREDNISONE 20 MG/1
TABLET ORAL
Qty: 15 TABLET | Refills: 0 | Status: SHIPPED | OUTPATIENT
Start: 2021-06-23

## 2021-06-23 NOTE — PATIENT INSTRUCTIONS
Prednisone as directed for pain/inflammation  Ibuprofen as directed for pain  No strenuous activity or heavy lifting  Apply ice as directed to decrease pain/inflammation  If no improvement and unable to work Friday call for work note    Call with any problems or concerns

## 2021-06-23 NOTE — PROGRESS NOTES
[facility]        NAME: Andrei Manzo is a 46 y o  male  : 1980    MRN: [id number]  DATE: 2021  TIME: 4:40 PM    Assessment and Plan   Strain of left groin [S76 212A]  1  Strain of left groin     2  Left-sided low back pain with left-sided sciatica, unspecified chronicity  predniSONE 20 mg tablet         Patient Instructions     Patient Instructions   Prednisone as directed for pain/inflammation  Ibuprofen as directed for pain  No strenuous activity or heavy lifting  Apply ice as directed to decrease pain/inflammation  If no improvement and unable to work Friday call for work note  Call with any problems or concerns          Chief Complaint     Chief Complaint   Patient presents with    Hip Pain     L hip pain X few months- worse last few days         History of Present Illness       C/o left low back/sciatic pain for a few months not improving  Pulled muscle in left groin x 2 days possible after walking dog  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  Review of Systems   Review of Systems   Constitutional: Positive for activity change  Negative for fever  Respiratory: Negative for cough, chest tightness and shortness of breath  Cardiovascular: Negative for chest pain  Gastrointestinal: Negative for abdominal pain  Genitourinary: Negative  Negative for difficulty urinating, scrotal swelling and testicular pain  Musculoskeletal: Positive for arthralgias, back pain and myalgias (muscle pull left groin)  Negative for gait problem  Skin: Negative for color change  Neurological: Negative for numbness  Psychiatric/Behavioral: Negative for decreased concentration and dysphoric mood  Current Medications       Current Outpatient Medications:     Multiple Vitamins-Minerals (MULTIVITAMIN WITH MINERALS) tablet, Take 1 tablet by mouth daily  , Disp: , Rfl:     Creatine POWD, Take 5 g by mouth daily   (Patient not taking: Reported on 6/23/2021), Disp: , Rfl:     methocarbamol (ROBAXIN) 500 mg tablet, Take 1 tablet (500 mg total) by mouth 3 (three) times a day (Patient not taking: Reported on 6/23/2021), Disp: 15 tablet, Rfl: 0    predniSONE 20 mg tablet, Take one table twice daily x 5 days, then take one tablet daily x 5 days, Disp: 15 tablet, Rfl: 0    sulfamethoxazole-trimethoprim (BACTRIM DS) 800-160 mg per tablet, Take 1 tablet by mouth every 12 (twelve) hours (Patient not taking: Reported on 6/23/2021), Disp: , Rfl:     Current Allergies     Allergies as of 06/23/2021 - Reviewed 06/23/2021   Allergen Reaction Noted    Ceclor [cefaclor] Anaphylaxis 02/05/2016    Cephalosporins Anaphylaxis 02/05/2016    Shellfish-derived products - food allergy  02/05/2016            The following portions of the patient's history were reviewed and updated as appropriate: allergies, current medications, past family history, past medical history, past social history, past surgical history and problem list      Past Medical History:   Diagnosis Date    Alcohol abuse, in remission     Onset: 18May 2009  History reviewed  No pertinent surgical history  Family History   Problem Relation Age of Onset    No Known Problems Mother          Medications have been verified  Objective   /62   Pulse 76   Resp 16   Ht 6' 2" (1 88 m)   Wt 78 kg (172 lb)   SpO2 97%   BMI 22 08 kg/m²        Physical Exam     Physical Exam  Vitals and nursing note reviewed  Constitutional:       General: He is not in acute distress  Appearance: Normal appearance  He is well-developed  He is not ill-appearing or diaphoretic  HENT:      Head: Normocephalic  Neck:      Thyroid: No thyromegaly  Trachea: No tracheal deviation  Pulmonary:      Effort: Pulmonary effort is normal  No respiratory distress     Musculoskeletal:         General: Tenderness (left low back pain, radiates to left hip  positive tenderness left groin with ROM) present  No swelling, deformity or signs of injury  Normal range of motion  Cervical back: Normal range of motion  Skin:     General: Skin is warm and dry  Neurological:      Mental Status: He is alert and oriented to person, place, and time  Psychiatric:         Mood and Affect: Mood normal          Speech: Speech normal          Behavior: Behavior normal          Thought Content:  Thought content normal          Judgment: Judgment normal            PHQ-9 Depression Screening    PHQ-9:   Frequency of the following problems over the past two weeks:      Little interest or pleasure in doing things: 0 - not at all  Feeling down, depressed, or hopeless: 0 - not at all  PHQ-2 Score: 0

## 2021-06-24 ENCOUNTER — TELEPHONE (OUTPATIENT)
Dept: FAMILY MEDICINE CLINIC | Facility: CLINIC | Age: 41
End: 2021-06-24

## 2021-06-24 NOTE — TELEPHONE ENCOUNTER
----- Message from Maribel Gaspar sent at 6/24/2021  1:44 PM EDT -----  Regarding: Visit Follow-Up Question  Contact: [phone]  [facility], saw [address] Schaefer about left hip pain/strain/injury  She said if I'm not feeling better today to call for work note  Can you supply one? Should be good to go with a couple days, maybe go back Sunday?

## 2021-06-25 ENCOUNTER — PATIENT MESSAGE (OUTPATIENT)
Dept: FAMILY MEDICINE CLINIC | Facility: CLINIC | Age: 41
End: 2021-06-25

## 2021-09-07 ENCOUNTER — LAB (OUTPATIENT)
Dept: LAB | Facility: HOSPITAL | Age: 41
End: 2021-09-07

## 2021-09-07 DIAGNOSIS — Z00.8 HEALTH EXAMINATION IN POPULATION SURVEY: ICD-10-CM

## 2021-09-07 LAB
CHOLEST SERPL-MCNC: 225 MG/DL (ref 50–200)
EST. AVERAGE GLUCOSE BLD GHB EST-MCNC: 108 MG/DL
HBA1C MFR BLD: 5.4 %
HDLC SERPL-MCNC: 80 MG/DL
LDLC SERPL CALC-MCNC: 130 MG/DL (ref 0–100)
NONHDLC SERPL-MCNC: 145 MG/DL
TRIGL SERPL-MCNC: 74 MG/DL

## 2021-09-07 PROCEDURE — 83036 HEMOGLOBIN GLYCOSYLATED A1C: CPT

## 2021-09-07 PROCEDURE — 80061 LIPID PANEL: CPT

## 2021-09-07 PROCEDURE — 36415 COLL VENOUS BLD VENIPUNCTURE: CPT

## 2021-11-22 ENCOUNTER — IMMUNIZATIONS (OUTPATIENT)
Dept: FAMILY MEDICINE CLINIC | Facility: CLINIC | Age: 41
End: 2021-11-22
Payer: COMMERCIAL

## 2021-11-22 DIAGNOSIS — Z23 NEED FOR INFLUENZA VACCINATION: Primary | ICD-10-CM

## 2021-11-22 PROCEDURE — 90686 IIV4 VACC NO PRSV 0.5 ML IM: CPT

## 2021-11-22 PROCEDURE — 90471 IMMUNIZATION ADMIN: CPT

## 2021-11-22 PROCEDURE — 96372 THER/PROPH/DIAG INJ SC/IM: CPT

## 2021-12-03 ENCOUNTER — APPOINTMENT (OUTPATIENT)
Dept: URGENT CARE | Facility: MEDICAL CENTER | Age: 41
End: 2021-12-03

## 2021-12-03 PROCEDURE — 86480 TB TEST CELL IMMUN MEASURE: CPT | Performed by: STUDENT IN AN ORGANIZED HEALTH CARE EDUCATION/TRAINING PROGRAM

## 2022-03-21 DIAGNOSIS — J01.00 ACUTE NON-RECURRENT MAXILLARY SINUSITIS: Primary | ICD-10-CM

## 2022-03-21 RX ORDER — SULFAMETHOXAZOLE AND TRIMETHOPRIM 800; 160 MG/1; MG/1
1 TABLET ORAL EVERY 12 HOURS SCHEDULED
Qty: 20 TABLET | Refills: 3 | Status: SHIPPED | OUTPATIENT
Start: 2022-03-21 | End: 2022-03-31

## 2022-08-11 ENCOUNTER — PATIENT MESSAGE (OUTPATIENT)
Dept: FAMILY MEDICINE CLINIC | Facility: CLINIC | Age: 42
End: 2022-08-11

## 2022-08-11 DIAGNOSIS — L70.0 CYSTIC ACNE: Primary | ICD-10-CM

## 2022-08-11 RX ORDER — SULFAMETHOXAZOLE AND TRIMETHOPRIM 800; 160 MG/1; MG/1
1 TABLET ORAL 2 TIMES DAILY
Qty: 20 TABLET | Refills: 3 | Status: SHIPPED | OUTPATIENT
Start: 2022-08-11 | End: 2022-08-21

## 2022-08-19 ENCOUNTER — PATIENT MESSAGE (OUTPATIENT)
Dept: FAMILY MEDICINE CLINIC | Facility: CLINIC | Age: 42
End: 2022-08-19

## 2022-08-19 DIAGNOSIS — N46.11 INFERTILITY DUE TO OLIGOSPERMIA: Primary | ICD-10-CM

## 2022-08-19 NOTE — PATIENT COMMUNICATION
Pt is aware of TW's advice and order  Pt advised to confirm that lab participated with his insurance, to make an appmt with the lab, and also to ensure of any potential special instructions

## 2022-11-23 ENCOUNTER — PATIENT MESSAGE (OUTPATIENT)
Dept: FAMILY MEDICINE CLINIC | Facility: CLINIC | Age: 42
End: 2022-11-23

## 2022-11-28 LAB
EXTERNAL HIV CONFIRMATION: NORMAL
EXTERNAL HIV SCREEN: NORMAL
HCV AB SER-ACNC: NEGATIVE

## 2023-02-14 ENCOUNTER — OFFICE VISIT (OUTPATIENT)
Dept: FAMILY MEDICINE CLINIC | Facility: CLINIC | Age: 43
End: 2023-02-14

## 2023-02-14 VITALS
WEIGHT: 182 LBS | HEART RATE: 95 BPM | SYSTOLIC BLOOD PRESSURE: 124 MMHG | TEMPERATURE: 97.4 F | OXYGEN SATURATION: 98 % | HEIGHT: 74 IN | BODY MASS INDEX: 23.36 KG/M2 | DIASTOLIC BLOOD PRESSURE: 80 MMHG

## 2023-02-14 DIAGNOSIS — Z00.00 WELLNESS EXAMINATION: Primary | ICD-10-CM

## 2023-02-14 DIAGNOSIS — F90.2 ATTENTION DEFICIT HYPERACTIVITY DISORDER (ADHD), COMBINED TYPE: ICD-10-CM

## 2023-02-14 DIAGNOSIS — N41.0 ACUTE PROSTATITIS: ICD-10-CM

## 2023-02-14 RX ORDER — DEXTROAMPHETAMINE SACCHARATE, AMPHETAMINE ASPARTATE, DEXTROAMPHETAMINE SULFATE AND AMPHETAMINE SULFATE 2.5; 2.5; 2.5; 2.5 MG/1; MG/1; MG/1; MG/1
10 TABLET ORAL
Qty: 60 TABLET | Refills: 0 | Status: SHIPPED | OUTPATIENT
Start: 2023-02-14

## 2023-02-14 RX ORDER — DOXYCYCLINE HYCLATE 100 MG
100 TABLET ORAL 2 TIMES DAILY
Qty: 28 TABLET | Refills: 0 | Status: SHIPPED | OUTPATIENT
Start: 2023-02-14 | End: 2023-02-28

## 2023-02-14 NOTE — PROGRESS NOTES
HPI:  Gurdeep Dobbs is a 41 y o  male here for his yearly health maintenance exam    There is no problem list on file for this patient  Past Medical History:   Diagnosis Date   • Alcohol abuse, in remission     Onset: 18May 2009  1  Advanced Directive: n     2  Durable Power of  for Healthcare: n     3  Social History:           Drug and alcohol History: n                  4  Immunizations up to date: y                 Lifestyle:                           Healthy Diet:y                          Alcohol Use:n                          Tobacco Use:n                          Regular exercise:y                          Weight concerns:n                               5  Over the past 2 weeks, how often have you been bothered by the following:              Little interest or pleasure in doing things:n              Felling down, depressed or hopeless:n       No current outpatient medications on file  No current facility-administered medications for this visit  Allergies   Allergen Reactions   • Ceclor [Cefaclor] Anaphylaxis   • Cephalosporins Anaphylaxis   • Shellfish-Derived Products - Food Allergy      Immunization History   Administered Date(s) Administered   • COVID-19 MODERNA VACC 0 5 ML IM 01/05/2021, 02/07/2021   • Influenza Quadrivalent Preservative Free 3 years and older IM 11/05/2015, 10/26/2017   • Influenza, injectable, quadrivalent, preservative free 0 5 mL 11/14/2018, 11/08/2019, 11/18/2020, 11/22/2021   • Influenza, seasonal, injectable 10/01/2016   • MMR 01/14/2015       Patient Care Team:  Elen Turcios MD as PCP - General    Review of Systems   Constitutional: Negative for fatigue, fever and unexpected weight change  HENT: Negative for congestion, sinus pain and sore throat  Eyes: Negative for visual disturbance  Respiratory: Negative for shortness of breath and wheezing  Cardiovascular: Negative for chest pain and palpitations     Gastrointestinal: Negative for abdominal pain, nausea and vomiting  Musculoskeletal: Negative  Negative for arthralgias and myalgias  Neurological: Negative for syncope, weakness and numbness  Psychiatric/Behavioral: Negative  Negative for confusion, dysphoric mood and suicidal ideas  Physical Exam :  Physical Exam  Constitutional:       Appearance: He is well-developed  HENT:      Right Ear: Ear canal normal  Tympanic membrane is not injected  Left Ear: Ear canal normal  Tympanic membrane is not injected  Nose: Nose normal    Eyes:      General:         Right eye: No discharge  Left eye: No discharge  Conjunctiva/sclera: Conjunctivae normal       Pupils: Pupils are equal, round, and reactive to light  Neck:      Thyroid: No thyromegaly  Cardiovascular:      Rate and Rhythm: Normal rate and regular rhythm  Heart sounds: Normal heart sounds  No murmur heard  Pulmonary:      Effort: Pulmonary effort is normal  No respiratory distress  Breath sounds: Normal breath sounds  No wheezing  Abdominal:      General: Bowel sounds are normal  There is no distension  Palpations: Abdomen is soft  Tenderness: There is no abdominal tenderness  Musculoskeletal:         General: Normal range of motion  Cervical back: Normal range of motion and neck supple  Lymphadenopathy:      Cervical: No cervical adenopathy  Skin:     General: Skin is warm and dry  Neurological:      Mental Status: He is alert and oriented to person, place, and time  He is not disoriented  Sensory: No sensory deficit  Gait: Gait normal       Deep Tendon Reflexes: Reflexes are normal and symmetric  Psychiatric:         Speech: Speech normal          Behavior: Behavior normal          Thought Content: Thought content normal          Judgment: Judgment normal            Assessment and Plan:  1   Wellness examination            Health Maintenance Due   Topic Date Due   • BMI: Adult  Never done   • DTaP,Tdap,and Td Vaccines (1 - Tdap) Never done   • COVID-19 Vaccine (3 - Booster for Moderna series) 04/04/2021   • Influenza Vaccine (1) 09/01/2022

## 2023-05-16 DIAGNOSIS — F90.2 ATTENTION DEFICIT HYPERACTIVITY DISORDER (ADHD), COMBINED TYPE: ICD-10-CM

## 2023-05-16 DIAGNOSIS — Z29.8 NEED FOR MALARIA PROPHYLAXIS: Primary | ICD-10-CM

## 2023-05-16 RX ORDER — DEXTROAMPHETAMINE SACCHARATE, AMPHETAMINE ASPARTATE, DEXTROAMPHETAMINE SULFATE AND AMPHETAMINE SULFATE 2.5; 2.5; 2.5; 2.5 MG/1; MG/1; MG/1; MG/1
10 TABLET ORAL
Qty: 60 TABLET | Refills: 0 | Status: SHIPPED | OUTPATIENT
Start: 2023-05-16

## 2023-05-16 RX ORDER — DOXYCYCLINE HYCLATE 100 MG/1
100 CAPSULE ORAL EVERY 12 HOURS SCHEDULED
Qty: 60 CAPSULE | Refills: 0 | Status: SHIPPED | OUTPATIENT
Start: 2023-05-16 | End: 2023-06-15

## 2023-05-19 ENCOUNTER — CLINICAL SUPPORT (OUTPATIENT)
Dept: FAMILY MEDICINE CLINIC | Facility: CLINIC | Age: 43
End: 2023-05-19

## 2023-05-19 DIAGNOSIS — Z23 NEED FOR VACCINATION: Primary | ICD-10-CM

## 2023-05-30 ENCOUNTER — TELEPHONE (OUTPATIENT)
Dept: FAMILY MEDICINE CLINIC | Facility: CLINIC | Age: 43
End: 2023-05-30

## 2023-05-30 NOTE — TELEPHONE ENCOUNTER
----- Message from Adrianne Hoyt MD sent at 5/29/2023  9:34 AM EDT -----  If fasting labs needs X9s----vckffk 377--sl low but not low enuf for replacement  ----- Message -----  From: Interface, Transcription Incoming  Sent: 5/27/2023   4:37 AM EDT  To: Adrianne Hoyt MD

## 2023-07-19 ENCOUNTER — TELEPHONE (OUTPATIENT)
Dept: FAMILY MEDICINE CLINIC | Facility: CLINIC | Age: 43
End: 2023-07-19

## 2024-01-03 ENCOUNTER — OFFICE VISIT (OUTPATIENT)
Dept: FAMILY MEDICINE CLINIC | Facility: CLINIC | Age: 44
End: 2024-01-03
Payer: COMMERCIAL

## 2024-01-03 VITALS
WEIGHT: 182 LBS | HEART RATE: 95 BPM | DIASTOLIC BLOOD PRESSURE: 78 MMHG | SYSTOLIC BLOOD PRESSURE: 124 MMHG | BODY MASS INDEX: 23.37 KG/M2 | OXYGEN SATURATION: 98 %

## 2024-01-03 DIAGNOSIS — F90.2 ATTENTION DEFICIT HYPERACTIVITY DISORDER (ADHD), COMBINED TYPE: ICD-10-CM

## 2024-01-03 DIAGNOSIS — F32.0 CURRENT MILD EPISODE OF MAJOR DEPRESSIVE DISORDER, UNSPECIFIED WHETHER RECURRENT (HCC): Primary | ICD-10-CM

## 2024-01-03 PROCEDURE — 99214 OFFICE O/P EST MOD 30 MIN: CPT

## 2024-01-03 RX ORDER — FLUOXETINE 10 MG/1
10 CAPSULE ORAL DAILY
Qty: 90 CAPSULE | Refills: 3 | Status: SHIPPED | OUTPATIENT
Start: 2024-01-03

## 2024-01-03 RX ORDER — DEXTROAMPHETAMINE SACCHARATE, AMPHETAMINE ASPARTATE, DEXTROAMPHETAMINE SULFATE AND AMPHETAMINE SULFATE 2.5; 2.5; 2.5; 2.5 MG/1; MG/1; MG/1; MG/1
10 TABLET ORAL
Qty: 60 TABLET | Refills: 0 | Status: SHIPPED | OUTPATIENT
Start: 2024-01-03

## 2024-01-04 PROBLEM — F90.2 ATTENTION DEFICIT HYPERACTIVITY DISORDER (ADHD), COMBINED TYPE: Status: ACTIVE | Noted: 2024-01-04

## 2024-01-04 PROBLEM — F32.0 CURRENT MILD EPISODE OF MAJOR DEPRESSIVE DISORDER (HCC): Status: ACTIVE | Noted: 2024-01-04

## 2024-01-04 NOTE — PROGRESS NOTES
"Name: Gabriel Juarez      : 1980      MRN: [id number]  Encounter Provider: LORETTA Nunes  Encounter Date: 1/3/2024   Encounter department: [facility]    Assessment & Plan     1. Current mild episode of major depressive disorder, unspecified whether recurrent (HCC)  Assessment & Plan:  Reports depression exacerbated by life events. States that he has \"about 5 minutes of peace until it feels like a weighted blanket falls on me.\" States that it is difficult to get out of bed and has been sleeping a lot. Denies SI. Trial prozac-- more energizing SSRI. Follow up in 4-6 weeks.     Orders:  -     FLUoxetine (PROzac) 10 mg capsule; Take 1 capsule (10 mg total) by mouth daily    2. Attention deficit hyperactivity disorder (ADHD), combined type  Assessment & Plan:  Reports that symptoms are well managed with adderall. Mostly utilizes for work.     Orders:  -     amphetamine-dextroamphetamine (ADDERALL, 10MG,) 10 mg tablet; Take 1 tablet (10 mg total) by mouth 2 (two) times a day Max Daily Amount: 20 mg        Depression Screening and Follow-up Plan: Patient was screened for depression during today's encounter. They screened negative with a PHQ-2 score of 0.        Subjective      Depression  This is a new problem. The current episode started more than 1 month ago. The problem occurs constantly. The problem has been gradually worsening. Associated symptoms include fatigue. Pertinent negatives include no abdominal pain, anorexia, arthralgias, change in bowel habit, chest pain, chills, congestion, coughing, diaphoresis, fever, headaches, joint swelling, myalgias, nausea, neck pain, numbness, rash, sore throat, swollen glands, urinary symptoms, vertigo, visual change, vomiting or weakness. The symptoms are aggravated by stress. He has tried nothing for the symptoms.     Review of Systems   Constitutional:  Positive for fatigue. Negative for activity change, chills, " diaphoresis and fever.   HENT:  Negative for congestion, ear pain, rhinorrhea and sore throat.    Eyes:  Negative for pain.   Respiratory:  Negative for cough, shortness of breath and wheezing.    Cardiovascular:  Negative for chest pain and leg swelling.   Gastrointestinal:  Negative for abdominal pain, anorexia, change in bowel habit, diarrhea, nausea and vomiting.   Musculoskeletal:  Negative for arthralgias, joint swelling, myalgias and neck pain.   Skin:  Negative for rash.   Neurological:  Negative for dizziness, vertigo, weakness, numbness and headaches.   Psychiatric/Behavioral:  Positive for decreased concentration, depression and dysphoric mood. Negative for suicidal ideas.    All other systems reviewed and are negative.      No current outpatient medications on file prior to visit.       Objective     /78 (BP Location: Left arm, Patient Position: Sitting, Cuff Size: Standard)   Pulse 95   Wt 82.6 kg (182 lb)   SpO2 98%   BMI 23.37 kg/m²     Physical Exam  Vitals and nursing note reviewed.   Constitutional:       Appearance: Normal appearance.   HENT:      Head: Normocephalic.   Cardiovascular:      Rate and Rhythm: Normal rate and regular rhythm.      Heart sounds: Normal heart sounds.   Pulmonary:      Effort: Pulmonary effort is normal.      Breath sounds: Normal breath sounds.   Abdominal:      General: Bowel sounds are normal.   Skin:     General: Skin is warm and dry.   Neurological:      General: No focal deficit present.      Mental Status: He is alert and oriented to person, place, and time. Mental status is at baseline.   Psychiatric:         Attention and Perception: Attention and perception normal.         Mood and Affect: Mood is depressed. Affect is tearful.         Speech: Speech normal.         Behavior: Behavior normal. Behavior is cooperative.         Thought Content: Thought content normal. Thought content is not paranoid or delusional. Thought content does not include  homicidal or suicidal ideation. Thought content does not include homicidal or suicidal plan.         Cognition and Memory: Cognition and memory normal.         Judgment: Judgment normal.       LORETTA Nunes

## 2024-01-04 NOTE — ASSESSMENT & PLAN NOTE
"Reports depression exacerbated by life events. States that he has \"about 5 minutes of peace until it feels like a weighted blanket falls on me.\" States that it is difficult to get out of bed and has been sleeping a lot. Denies SI. Trial prozac-- more energizing SSRI. Follow up in 4-6 weeks.   "

## 2024-02-05 ENCOUNTER — OFFICE VISIT (OUTPATIENT)
Dept: FAMILY MEDICINE CLINIC | Facility: CLINIC | Age: 44
End: 2024-02-05
Payer: COMMERCIAL

## 2024-02-05 VITALS — BODY MASS INDEX: 23.37 KG/M2 | DIASTOLIC BLOOD PRESSURE: 74 MMHG | SYSTOLIC BLOOD PRESSURE: 118 MMHG | WEIGHT: 182 LBS

## 2024-02-05 DIAGNOSIS — F32.0 CURRENT MILD EPISODE OF MAJOR DEPRESSIVE DISORDER WITHOUT PRIOR EPISODE (HCC): ICD-10-CM

## 2024-02-05 DIAGNOSIS — F10.10 ALCOHOL ABUSE: Primary | ICD-10-CM

## 2024-02-05 DIAGNOSIS — Z76.89 RETURN TO WORK EVALUATION: ICD-10-CM

## 2024-02-05 PROCEDURE — 99214 OFFICE O/P EST MOD 30 MIN: CPT | Performed by: FAMILY MEDICINE

## 2024-02-05 RX ORDER — BUPROPION HYDROCHLORIDE 150 MG/1
150 TABLET ORAL EVERY MORNING
Qty: 90 TABLET | Refills: 3 | Status: SHIPPED | OUTPATIENT
Start: 2024-02-05 | End: 2025-01-30

## 2024-02-05 NOTE — PROGRESS NOTES
Assessment/Plan:    Return to work evaluation  Cleared for work w no restr       Diagnoses and all orders for this visit:    Alcohol abuse    Current mild episode of major depressive disorder without prior episode (HCC)  -     buPROPion (WELLBUTRIN XL) 150 mg 24 hr tablet; Take 1 tablet (150 mg total) by mouth every morning    Return to work evaluation          Subjective:   Chief Complaint   Patient presents with    [facility]        Patient ID: Gabriel Juarez is a 44 y.o. male.    HPI    The following portions of the patient's history were reviewed and updated as appropriate: allergies, current medications, past family history, past medical history, past social history, past surgical history and problem list.    Review of Systems   Constitutional:  Negative for fatigue, fever and unexpected weight change.   HENT:  Negative for congestion, sinus pain and sore throat.    Eyes:  Negative for visual disturbance.   Respiratory:  Negative for shortness of breath and wheezing.    Cardiovascular:  Negative for chest pain and palpitations.   Gastrointestinal:  Negative for abdominal pain, nausea and vomiting.   Musculoskeletal: Negative.  Negative for arthralgias and myalgias.   Neurological:  Negative for syncope, weakness and numbness.   Psychiatric/Behavioral: Negative.  Negative for confusion, dysphoric mood and suicidal ideas.          Objective:  Vitals:    02/05/24 1056   BP: 118/74   BP Location: Left arm   Patient Position: Sitting   Cuff Size: Standard   Weight: 82.6 kg (182 lb)      Physical Exam  Constitutional:       Appearance: He is well-developed.   HENT:      Right Ear: Ear canal normal. Tympanic membrane is not injected.      Left Ear: Ear canal normal. Tympanic membrane is not injected.      Nose: Nose normal.   Eyes:      General:         Right eye: No discharge.         Left eye: No discharge.      Conjunctiva/sclera: Conjunctivae normal.      Pupils: Pupils are equal, round, and reactive to  light.   Neck:      Thyroid: No thyromegaly.   Cardiovascular:      Rate and Rhythm: Normal rate and regular rhythm.      Heart sounds: Normal heart sounds. No murmur heard.  Pulmonary:      Effort: Pulmonary effort is normal. No respiratory distress.      Breath sounds: Normal breath sounds. No wheezing.   Abdominal:      General: Bowel sounds are normal. There is no distension.      Palpations: Abdomen is soft.      Tenderness: There is no abdominal tenderness.   Musculoskeletal:         General: Normal range of motion.      Cervical back: Normal range of motion and neck supple.   Lymphadenopathy:      Cervical: No cervical adenopathy.   Skin:     General: Skin is warm and dry.   Neurological:      Mental Status: He is alert and oriented to person, place, and time. He is not disoriented.      Sensory: No sensory deficit.      Motor: No weakness.      Coordination: Coordination normal.      Gait: Gait normal.      Deep Tendon Reflexes: Reflexes are normal and symmetric.   Psychiatric:         Speech: Speech normal.         Behavior: Behavior normal.         Thought Content: Thought content normal.         Judgment: Judgment normal.

## 2024-08-13 NOTE — PROGRESS NOTES
PT Evaluation     Today's date: 2024  Patient name: Gabriel Juarez  : 1980  MRN: [id number]  Referring provider: Gifty Tripp PT  Dx:   Encounter Diagnosis     ICD-10-CM    1. Muscle weakness (generalized)  M62.81       2. Balance disorder  R26.89       3. Gait abnormality  R26.9                      Assessment  Impairments: abnormal coordination, abnormal gait, abnormal muscle firing, abnormal muscle tone, abnormal or restricted ROM, abnormal movement, activity intolerance, impaired balance, impaired physical strength, lacks appropriate home exercise program, pain with function, scapular dyskinesis, weight-bearing intolerance, poor posture , poor body mechanics and endurance  Symptom irritability: moderate    Assessment details: Gabriel Juarez is a 44 y.o. male presenting to outpatient physical therapy at [facility] with complaints of deconditioning and imbalance from recent mono and sepsis diagnosis. He presents with decreased range of motion, decreased strength, limited flexibility, poor postural awareness, poor body mechanics, altered gait pattern, poor balance, decreased tolerance to activity and decreased functional mobility due to Muscle weakness (generalized)  (primary encounter diagnosis), Balance disorder, and Gait abnormality.  He would benefit from skilled PT services in order to address these deficits and reach maximum level of function.  Thank you for the referral!   Understanding of Dx/Px/POC: good     Prognosis: good    Goals  STGs (in 4 weeks):  1. Pt will report having at least a 50% improvement since I.E.   2. Pt will be able to perform at least 15 STS in 30 seconds.   3. Pt will be able to perform 5 STS in 10 seconds or less than.     LTGs (in 12 weeks):  1. Pt will report having at least a 75% improvement since I.E.   2. Pt will amb with symmetrical gait pattern over even and uneven terrain without AD.   3. Pt will ascend/descend steps and curbs with reciprocal pattern with  good eccentric quad control.   4. Pt will be able bend, squat, and kneel with good form.   5. Pt will be able to lift for ADLs and work duties with good form and balance.     Plan  Patient would benefit from: PT eval and skilled physical therapy  Planned modality interventions: TENS and unattended electrical stimulation    Planned therapy interventions: IASTM, joint mobilization, manual therapy, neuromuscular re-education, patient/caregiver education, self care, strengthening, stretching, therapeutic activities, therapeutic exercise, graded motor, flexibility and balance    Frequency: 2x week  Plan of Care beginning date: 2024  Plan of Care expiration date: 2024  Treatment plan discussed with: patient    Subjective Evaluation    History of Present Illness  Mechanism of injury: Pt is a 44 year old male who presents with deconditioning when he was diagnosed with mono and streph in  and became septic. He reports that he went to the hospital and then discharged to home where he would sleep a lot. He ended up back in the hospital due to electrolyte imbalances and deconditioning. He was in the hospital for one week and then discharged home last Thursday (24). Since being home he reports gradually improving with his walking. He has self progressed from RW to not using an AD. He reports having inpatient PT however no home therapies. He does report falling once while at home and hyperextended his L wrist. 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   Quality of life: good    Patient Goals  Patient goals for therapy: decreased pain, improved balance, increased motion, increased strength, independence with ADLs/IADLs and return to sport/leisure activities    Pain  Current pain ratin  At best pain ratin  At worst pain ratin  Pain location: L wrist.  Quality: sore, tender.  Relieving factors: relaxation and rest  Aggravating factors:  standing and walking (L gripping)  Progression: improved    Hand dominance: right    Treatments  Discharged from (in last 30 days): inpatient hospitalization    Objective    Posture: Lumbar lordosis is decreased in standing. There is no lateral shift.        Lumbar AROM limitations:  (*=  Pain)  Lumbar flexion: Mod loss  Lumbar extension: Mod loss  R side glide:  Mod loss  L side glide:  Mod loss    Wrist AROM: (measured in degrees)      R  L  Wrist Flex   NT  NT  Wrist Ext   NT  NT    Forearm Supination  NT  NT  Forearm Pronation  NT  NT    RD    NT  NT  UD    NT  NT    Strength:  Core strength: Upper abs: 3+/5; lower abs: 3-/5     Right  Left  Hip flexion:  3+/5  3/5  Knee ext  3/5  3/5  Ankle DF  3+/5  3+/5  Great toe ext  4/5  4/5  Ankle PF  3-/5  3-/5  Knee flex  3/5  3/5      Hip abduction  3-/5  3-/5  Hip adduction  3/5  3/5  Hip extension  3-/5  3-/5    Shoulder Flex  3+/5  3+/5  Shoulder ABD  3+/5  3+/5  S' ER   3/5  3/5  S' IR   4-/5  4-/5     strength: L2: (R) 100# (L) 50#    Sensation: intact throughout BLE with light touch     Function:   Pt ambs with ataxic gait pattern with decreased quad control resulting in occasional hyperextension of his knees.     30 second sit<>stand: 9 times  5 times: 13 seconds   DGI: 9/24  6 MWT: NT    Babinski: TBA  Clonus: TBA    Precautions: fall risk    HEP: TAC, quad sets, SLR (flex), TB Bridges, standing heel raises at counter    Specialty Daily Treatment Diary       Manual 8/14                                               Exercise Diary         RB        Elliptical                TB scap retraction        TB B S' Ext        TB B ER        TB B Horiz ABD                Wrist Flexion AROM        Wrist Extension AROM        Therabar (AP twists)        Pronation/ Supination AROM        Diggiflex        Metal gripper                Wrist flexion and extension stretch                                HR/TR 10 (HR with counter top)       Chair squats        Step ups         Lat Step ups        Step down        Static lunges                Floor -> airex NBOS (EO/EC)        Floor -> airex Tandem (EO/EC)        Floor -> airex SLS (EO/EC)                1 cone tap alt LEs        2 cone tap alt LEs                TAC TB Pallof Press         TAC TB Pallof Press to twist                        SLR (flex) 0# 10 ea       Sidelying H' ABD        Prone H' Ext                TB Sidelying clamshells        TB Bridges BTB 5 sec x 10               TAC 10 sec x 10       TAC c mod march (R up, R down, L up, L down)        TAC c March (R up, L up, R down, L down)        TAC OH reach feet on table -> feet off table        TAC Dying Bug        TAC c LTR feet on table -> feet off table                Quadruped Alt UE        Quadruped Alt LE        Quadruped Bird-dog (Alt UE and LE)                                        HEP/EDU Diagnosis, prognosis, POC, proper responses to exercises       Modalities

## 2024-08-14 ENCOUNTER — OFFICE VISIT (OUTPATIENT)
Dept: PHYSICAL THERAPY | Facility: CLINIC | Age: 44
End: 2024-08-14
Payer: COMMERCIAL

## 2024-08-14 DIAGNOSIS — M62.81 MUSCLE WEAKNESS (GENERALIZED): Primary | ICD-10-CM

## 2024-08-14 DIAGNOSIS — R26.9 GAIT ABNORMALITY: ICD-10-CM

## 2024-08-14 DIAGNOSIS — R26.89 BALANCE DISORDER: ICD-10-CM

## 2024-08-14 PROCEDURE — 97162 PT EVAL MOD COMPLEX 30 MIN: CPT | Performed by: PHYSICAL THERAPIST

## 2024-08-14 PROCEDURE — 97110 THERAPEUTIC EXERCISES: CPT | Performed by: PHYSICAL THERAPIST

## 2024-08-16 ENCOUNTER — OFFICE VISIT (OUTPATIENT)
Dept: PHYSICAL THERAPY | Facility: CLINIC | Age: 44
End: 2024-08-16
Payer: COMMERCIAL

## 2024-08-16 DIAGNOSIS — M62.81 MUSCLE WEAKNESS (GENERALIZED): Primary | ICD-10-CM

## 2024-08-16 DIAGNOSIS — R26.89 BALANCE DISORDER: ICD-10-CM

## 2024-08-16 DIAGNOSIS — R26.9 GAIT ABNORMALITY: ICD-10-CM

## 2024-08-16 PROCEDURE — 97530 THERAPEUTIC ACTIVITIES: CPT | Performed by: PHYSICAL THERAPIST

## 2024-08-16 PROCEDURE — 97112 NEUROMUSCULAR REEDUCATION: CPT | Performed by: PHYSICAL THERAPIST

## 2024-08-16 PROCEDURE — 97110 THERAPEUTIC EXERCISES: CPT | Performed by: PHYSICAL THERAPIST

## 2024-08-16 NOTE — PROGRESS NOTES
"Daily Note     Today's date: 2024  Patient name: Gabriel Juarez  : 1980  MRN: [id number]  Referring provider: Jonnie Enriquez MD  Dx:   Encounter Diagnosis     ICD-10-CM    1. Muscle weakness (generalized)  M62.81       2. Balance disorder  R26.89       3. Gait abnormality  R26.9                      Subjective: Patient reports that he felt fine after last visit.       Objective: See treatment diary below      Assessment: Tolerated treatment well; initiated POC with RB for active warmup. Vcs provided on proper sequencing with exercises; increased CKC strengthening and balancing exercises as well as increasing coordination. Occasional CGA provided during step ups and static balancing exercises. He denies having pain throughout session.   Patient demonstrated fatigue post treatment and would benefit from continued PT      Plan: Continue per plan of care.      Precautions: fall risk    HEP: TAC, quad sets, SLR (flex), TB Bridges, standing heel raises at counter    Specialty Daily Treatment Diary       Manual                                               Exercise Diary         RB  5 mins at Level 4      Elliptical                TB scap retraction  BTB 2 sec x 20      TB B S' Ext  BTB 2 sec x 20      TB B ER        TB B Horiz ABD                Wrist Flexion AROM        Wrist Extension AROM        Therabar (AP twists)        Pronation/ Supination AROM        Diggiflex        Metal gripper                Wrist flexion and extension stretch                                HR/TR 10 (HR with counter top) 20 ea      Chair squats        Step ups  (6\") 20 ea      Lat Step ups  (6\") 20 ea       Step down        Static lunges                Floor -> airex NBOS (EO/EC)  Airex: 2 mins (EO/EC)      Floor -> airex Tandem (EO/EC)  Airex: 30 sec x 2 ea (EO)      Floor -> airex SLS (EO/EC)  Floor: 20 sec x 3 ea              1 cone tap alt LEs  1 min       2 cone tap alt LEs  1 min              TAC TB Pallof Press "         TAC TB Pallof Press to twist                        SLR (flex) 0# 10 ea       Sidelying H' ABD        Prone H' Ext                TB Sidelying clamshells  BTB 5 sec; 2x10      TB Bridges BTB 5 sec x 10 BTB 5 sec x 25              TAC 10 sec x 10       TAC c mod march (R up, R down, L up, L down)  20 ea      TAC c March (R up, L up, R down, L down)        TAC OH reach feet on table -> feet off table        TAC Dying Bug        TAC c LTR feet on table -> feet off table                Quadruped Alt UE        Quadruped Alt LE        Quadruped Bird-dog (Alt UE and LE)                                        HEP/EDU Diagnosis, prognosis, POC, proper responses to exercises       Modalities

## 2024-08-19 ENCOUNTER — OFFICE VISIT (OUTPATIENT)
Dept: FAMILY MEDICINE CLINIC | Facility: CLINIC | Age: 44
End: 2024-08-19
Payer: COMMERCIAL

## 2024-08-19 VITALS
BODY MASS INDEX: 24.26 KG/M2 | OXYGEN SATURATION: 98 % | HEIGHT: 74 IN | HEART RATE: 75 BPM | SYSTOLIC BLOOD PRESSURE: 116 MMHG | TEMPERATURE: 97.6 F | DIASTOLIC BLOOD PRESSURE: 70 MMHG | WEIGHT: 189 LBS

## 2024-08-19 DIAGNOSIS — F32.0 CURRENT MILD EPISODE OF MAJOR DEPRESSIVE DISORDER WITHOUT PRIOR EPISODE (HCC): ICD-10-CM

## 2024-08-19 DIAGNOSIS — F51.01 PRIMARY INSOMNIA: ICD-10-CM

## 2024-08-19 DIAGNOSIS — Z00.00 WELLNESS EXAMINATION: ICD-10-CM

## 2024-08-19 DIAGNOSIS — F90.2 ATTENTION DEFICIT HYPERACTIVITY DISORDER (ADHD), COMBINED TYPE: Primary | ICD-10-CM

## 2024-08-19 PROCEDURE — 99396 PREV VISIT EST AGE 40-64: CPT | Performed by: FAMILY MEDICINE

## 2024-08-19 RX ORDER — LORAZEPAM 1 MG/1
1 TABLET ORAL EVERY 8 HOURS PRN
Qty: 60 TABLET | Refills: 3 | Status: SHIPPED | OUTPATIENT
Start: 2024-08-19

## 2024-08-19 RX ORDER — HYDROXYZINE PAMOATE 100 MG
100 CAPSULE ORAL 3 TIMES DAILY PRN
Qty: 90 CAPSULE | Refills: 3 | Status: SHIPPED | OUTPATIENT
Start: 2024-08-19

## 2024-08-19 NOTE — PROGRESS NOTES
HPI:  Gabriel Juarez is a 44 y.o. male here for his yearly health maintenance exam.   Patient Active Problem List   Diagnosis    Attention deficit hyperactivity disorder (ADHD), combined type    Current mild episode of major depressive disorder (HCC)    Alcohol abuse    Return to work evaluation     Past Medical History:   Diagnosis Date    Alcohol abuse, in remission     Onset: 18May 2009.        1. Advanced Directive: n     2. Durable Power of  for Healthcare: n     3. Social History:           Drug and alcohol History: n                  4. Immunizations up to date: y                 Lifestyle:                           Healthy Diet:y                          Alcohol Use:n                          Tobacco Use:n                          Regular exercise:y                          Weight concerns:n                               5. Over the past 2 weeks, how often have you been bothered by the following:              Little interest or pleasure in doing things:n              Felling down, depressed or hopeless:n       Current Outpatient Medications   Medication Sig Dispense Refill    amphetamine-dextroamphetamine (ADDERALL, 10MG,) 10 mg tablet Take 1 tablet (10 mg total) by mouth 2 (two) times a day Max Daily Amount: 20 mg 60 tablet 0    buPROPion (WELLBUTRIN XL) 150 mg 24 hr tablet Take 1 tablet (150 mg total) by mouth every morning 90 tablet 3    hydrOXYzine (VISTARIL) 100 MG capsule Take 1 capsule (100 mg total) by mouth 3 (three) times a day as needed for itching 90 capsule 3    LORazepam (ATIVAN) 1 mg tablet Take 1 tablet (1 mg total) by mouth every 8 (eight) hours as needed for anxiety 60 tablet 3     No current facility-administered medications for this visit.     Allergies   Allergen Reactions    Ceclor [Cefaclor] Anaphylaxis    Cephalosporins Anaphylaxis    Shellfish-Derived Products - Food Allergy      Immunization History   Administered Date(s) Administered    COVID-19 MODERNA VACC 0.5 ML IM  01/05/2021, 02/07/2021    Hep A, adult 05/19/2023    Influenza Quadrivalent Preservative Free 3 years and older IM 11/05/2015, 10/26/2017    Influenza, injectable, quadrivalent, preservative free 0.5 mL 11/14/2018, 11/08/2019, 11/18/2020, 11/22/2021    Influenza, seasonal, injectable 10/01/2016    MMR 01/14/2015       Patient Care Team:  Jonnie Enriquez MD as PCP - General    Review of Systems   Constitutional:  Negative for fatigue, fever and unexpected weight change.   HENT:  Negative for congestion, sinus pain and sore throat.    Eyes:  Negative for visual disturbance.   Respiratory:  Negative for shortness of breath and wheezing.    Cardiovascular:  Negative for chest pain and palpitations.   Gastrointestinal:  Negative for abdominal pain, nausea and vomiting.   Musculoskeletal: Negative.  Negative for arthralgias and myalgias.   Neurological:  Negative for syncope, weakness and numbness.   Psychiatric/Behavioral: Negative.  Negative for confusion, dysphoric mood and suicidal ideas.        Physical Exam :  Physical Exam  Constitutional:       Appearance: He is well-developed.   HENT:      Right Ear: Ear canal normal. Tympanic membrane is not injected.      Left Ear: Ear canal normal. Tympanic membrane is not injected.      Nose: Nose normal.   Eyes:      General:         Right eye: No discharge.         Left eye: No discharge.      Conjunctiva/sclera: Conjunctivae normal.      Pupils: Pupils are equal, round, and reactive to light.   Neck:      Thyroid: No thyromegaly.   Cardiovascular:      Rate and Rhythm: Normal rate and regular rhythm.      Heart sounds: Normal heart sounds. No murmur heard.  Pulmonary:      Effort: Pulmonary effort is normal. No respiratory distress.      Breath sounds: Normal breath sounds. No wheezing.   Abdominal:      General: Bowel sounds are normal. There is no distension.      Palpations: Abdomen is soft.      Tenderness: There is no abdominal tenderness.   Musculoskeletal:          General: Normal range of motion.      Cervical back: Normal range of motion and neck supple.   Lymphadenopathy:      Cervical: No cervical adenopathy.   Skin:     General: Skin is warm and dry.   Neurological:      Mental Status: He is alert and oriented to person, place, and time. He is not disoriented.      Sensory: No sensory deficit.      Motor: No weakness.      Coordination: Coordination normal.      Gait: Gait normal.      Deep Tendon Reflexes: Reflexes are normal and symmetric.   Psychiatric:         Speech: Speech normal.         Behavior: Behavior normal.         Thought Content: Thought content normal.         Judgment: Judgment normal.           Assessment and Plan:  1. Attention deficit hyperactivity disorder (ADHD), combined type        2. Current mild episode of major depressive disorder without prior episode (HCC)        3. Wellness examination  Lipid Panel with Direct LDL reflex    Hemoglobin A1C      4. Primary insomnia  hydrOXYzine (VISTARIL) 100 MG capsule    LORazepam (ATIVAN) 1 mg tablet          Health Maintenance Due   Topic Date Due    PT PLAN OF CARE  Never done    DTaP,Tdap,and Td Vaccines (1 - Tdap) Never done    COVID-19 Vaccine (3 - 2023-24 season) 09/01/2023    Hepatitis A Vaccine (2 of 2 - Risk 2-dose series) 11/19/2023    Annual Physical  02/14/2024    Influenza Vaccine (1) 09/01/2024    Depression Screening  02/05/2025

## 2024-08-20 ENCOUNTER — OFFICE VISIT (OUTPATIENT)
Dept: PHYSICAL THERAPY | Facility: CLINIC | Age: 44
End: 2024-08-20
Payer: COMMERCIAL

## 2024-08-20 DIAGNOSIS — R26.9 GAIT ABNORMALITY: ICD-10-CM

## 2024-08-20 DIAGNOSIS — R26.89 BALANCE DISORDER: ICD-10-CM

## 2024-08-20 DIAGNOSIS — M62.81 MUSCLE WEAKNESS (GENERALIZED): Primary | ICD-10-CM

## 2024-08-20 DIAGNOSIS — F90.2 ATTENTION DEFICIT HYPERACTIVITY DISORDER (ADHD), COMBINED TYPE: ICD-10-CM

## 2024-08-20 PROCEDURE — 97112 NEUROMUSCULAR REEDUCATION: CPT | Performed by: PHYSICAL THERAPIST

## 2024-08-20 PROCEDURE — 97530 THERAPEUTIC ACTIVITIES: CPT | Performed by: PHYSICAL THERAPIST

## 2024-08-20 PROCEDURE — 97110 THERAPEUTIC EXERCISES: CPT | Performed by: PHYSICAL THERAPIST

## 2024-08-20 NOTE — PROGRESS NOTES
"Daily Note     Today's date: 2024  Patient name: Gabriel Juarez  : 1980  MRN: [id number]  Referring provider: Jonnie Enriquez MD  Dx:   Encounter Diagnosis     ICD-10-CM    1. Muscle weakness (generalized)  M62.81       2. Gait abnormality  R26.9       3. Balance disorder  R26.89                      Subjective: Patient reports that he felt fine after last visit.       Objective: See treatment diary below      Assessment: Tolerated treatment well; initiated POC with RB for active warmup. Vcs provided on proper sequencing with exercises; added step downs, leg press, wrist flexion and extension. Therapist provided CGA and close supervision for balance. Discussed HEP and exercise progression at home. He denies having increased pain throughout session. He reports feeling more steady post session.   Patient demonstrated fatigue post treatment and would benefit from continued PT      Plan: Continue per plan of care.      Precautions: fall risk    HEP: TAC, quad sets, SLR (flex), TB Bridges, standing heel raises at counter    Specialty Daily Treatment Diary       Manual                                              Exercise Diary         RB  5 mins at Level 4 5 mins at Level 4     Elliptical                TB scap retraction  BTB 2 sec x 20 BTB 5 sec x 30     TB B S' Ext  BTB 2 sec x 20 BTB 5 sec x 30     TB B ER        TB B Horiz ABD                Wrist Flexion AROM   3# 2x15      Wrist Extension AROM   3# 2x15     Therabar (AP twists)        Pronation/ Supination AROM        Diggiflex        Metal gripper                Wrist flexion and extension stretch                                HR/TR 10 (HR with counter top) 20 ea 20 ea     Chair squats        Slant board squats   NV?     Step ups  (6\") 20 ea (6\") 20 ea     Lat Step ups  (6\") 20 ea  (6\") 20 ea     Step down   (4\") 5 ea  (6\") 2x10 ea     Static lunges   NV?             Standing H' ABD   BTB around knees: 2x10 ea             Floor -> " airex NBOS (EO/EC)  Airex: 2 mins (EO/EC) Airex: 2 mins (EO/EC)     Floor -> airex Tandem (EO/EC)  Airex: 30 sec x 2 ea (EO) Airex: 30 sec x 2 ea (EC/EC)      Floor -> airex SLS (EO/EC)  Floor: 20 sec x 3 ea              1 cone tap alt LEs  1 min  1 min     2 cone tap alt LEs  1 min 1 min             TAC TB Pallof Press         TAC TB Pallof Press to twist                        SLR (flex) 0# 10 ea       Sidelying H' ABD        Prone H' Ext                TB Sidelying clamshells  BTB 5 sec; 2x10 DC     TB Bridges BTB 5 sec x 10 BTB 5 sec x 25 DC             TAC 10 sec x 10       TAC c mod march (R up, R down, L up, L down)  20 ea NV?     TAC c March (R up, L up, R down, L down)        TAC OH reach feet on table -> feet off table        TAC Dying Bug        TAC c LTR feet on table -> feet off table                Quadruped Alt UE        Quadruped Alt LE        Quadruped Bird-dog (Alt UE and LE)                        Leg press (B, uni ecc) at seat 4    80# 20 (B)  50# 20 (uni) (ecc)               HEP/EDU Diagnosis, prognosis, POC, proper responses to exercises       Modalities

## 2024-08-21 DIAGNOSIS — F32.0 CURRENT MILD EPISODE OF MAJOR DEPRESSIVE DISORDER WITHOUT PRIOR EPISODE (HCC): ICD-10-CM

## 2024-08-21 RX ORDER — DEXTROAMPHETAMINE SACCHARATE, AMPHETAMINE ASPARTATE, DEXTROAMPHETAMINE SULFATE AND AMPHETAMINE SULFATE 2.5; 2.5; 2.5; 2.5 MG/1; MG/1; MG/1; MG/1
10 TABLET ORAL
Qty: 60 TABLET | Refills: 0 | Status: SHIPPED | OUTPATIENT
Start: 2024-08-21

## 2024-08-22 RX ORDER — BUPROPION HYDROCHLORIDE 150 MG/1
150 TABLET ORAL EVERY MORNING
Qty: 90 TABLET | Refills: 1 | Status: SHIPPED | OUTPATIENT
Start: 2024-08-22 | End: 2025-08-17

## 2024-08-23 ENCOUNTER — APPOINTMENT (OUTPATIENT)
Dept: LAB | Facility: HOSPITAL | Age: 44
End: 2024-08-23

## 2024-08-23 ENCOUNTER — OFFICE VISIT (OUTPATIENT)
Dept: PHYSICAL THERAPY | Facility: CLINIC | Age: 44
End: 2024-08-23
Payer: COMMERCIAL

## 2024-08-23 DIAGNOSIS — Z00.00 WELLNESS EXAMINATION: ICD-10-CM

## 2024-08-23 DIAGNOSIS — R26.9 GAIT ABNORMALITY: ICD-10-CM

## 2024-08-23 DIAGNOSIS — M62.81 MUSCLE WEAKNESS (GENERALIZED): Primary | ICD-10-CM

## 2024-08-23 DIAGNOSIS — Z00.8 HEALTH EXAMINATION IN POPULATION SURVEY: ICD-10-CM

## 2024-08-23 DIAGNOSIS — R26.89 BALANCE DISORDER: ICD-10-CM

## 2024-08-23 LAB
CHOLEST SERPL-MCNC: 238 MG/DL
EST. AVERAGE GLUCOSE BLD GHB EST-MCNC: 97 MG/DL
HBA1C MFR BLD: 5 %
HDLC SERPL-MCNC: 53 MG/DL
LDLC SERPL CALC-MCNC: 169 MG/DL (ref 0–100)
TRIGL SERPL-MCNC: 79 MG/DL

## 2024-08-23 PROCEDURE — 80061 LIPID PANEL: CPT

## 2024-08-23 PROCEDURE — 36415 COLL VENOUS BLD VENIPUNCTURE: CPT

## 2024-08-23 PROCEDURE — 97530 THERAPEUTIC ACTIVITIES: CPT | Performed by: PHYSICAL THERAPIST

## 2024-08-23 PROCEDURE — 97112 NEUROMUSCULAR REEDUCATION: CPT | Performed by: PHYSICAL THERAPIST

## 2024-08-23 PROCEDURE — 83036 HEMOGLOBIN GLYCOSYLATED A1C: CPT

## 2024-08-23 NOTE — PROGRESS NOTES
"Daily Note     Today's date: 2024  Patient name: Gabriel Juarez  : 1980  MRN: [id number]  Referring provider: Jonnie Enriquez MD  Dx:   Encounter Diagnosis     ICD-10-CM    1. Muscle weakness (generalized)  M62.81       2. Gait abnormality  R26.9       3. Balance disorder  R26.89                      Subjective: He reports that he continues to feel better with therapy.       Objective: See treatment diary below      Assessment: Tolerated treatment well; initiated POC with RB for active warmup. Vcs provided on proper sequencing with exercises; added squats on slant board for quad activation. He denies having pain throughout session however does fatigue. He was instructed on DOMS. Encouraged rest for recovery and stretching.   Patient demonstrated fatigue post treatment and would benefit from continued PT      Plan: Continue per plan of care.      Precautions: fall risk    HEP: TAC, quad sets, SLR (flex), TB Bridges, standing heel raises at counter    Specialty Daily Treatment Diary       Manual                                             Exercise Diary         RB  5 mins at Level 4 5 mins at Level 4 12 mins at Level 4    Elliptical                TB scap retraction  BTB 2 sec x 20 BTB 5 sec x 30 BTB 5 sec x 30    TB B S' Ext  BTB 2 sec x 20 BTB 5 sec x 30 BTB 5 sec x 30    TB B ER        TB B Horiz ABD                Wrist Flexion AROM   3# 2x15      Wrist Extension AROM   3# 2x15     Therabar (AP twists)        Pronation/ Supination AROM        Diggiflex        Metal gripper                Wrist flexion and extension stretch                                HR/TR 10 (HR with counter top) 20 ea 20 ea 30 ea    Chair squats        Slant board squats   NV? 10x at bar    Step ups  (6\") 20 ea (6\") 20 ea (6\") 30 ea    Lat Step ups  (6\") 20 ea  (6\") 20 ea (6\") 30 ea    Step down   (4\") 5 ea  (6\") 2x10 ea (6\") 2x10 ea    Static lunges   NV? NV?            Standing H' ABD   BTB around knees: " 2x10 ea NV?            Floor -> airex NBOS (EO/EC)  Airex: 2 mins (EO/EC) Airex: 2 mins (EO/EC) DC    Floor -> airex Tandem (EO/EC)  Airex: 30 sec x 2 ea (EO) Airex: 30 sec x 2 ea (EC/EC)  Airex: 30 sec x 2 ea (EO/EC)    Floor -> airex SLS (EO/EC)  Floor: 20 sec x 3 ea              1 cone tap alt LEs  1 min  1 min 1 min    2 cone tap alt LEs  1 min 1 min 1 min            TAC TB Pallof Press         TAC TB Pallof Press to twist                        SLR (flex) 0# 10 ea       Sidelying H' ABD        Prone H' Ext                TB Sidelying clamshells  BTB 5 sec; 2x10 DC     TB Bridges BTB 5 sec x 10 BTB 5 sec x 25 DC             TAC 10 sec x 10       TAC c mod march (R up, R down, L up, L down)  20 ea NV?     TAC c March (R up, L up, R down, L down)        TAC OH reach feet on table -> feet off table        TAC Dying Bug        TAC c LTR feet on table -> feet off table                Quadruped Alt UE        Quadruped Alt LE        Quadruped Bird-dog (Alt UE and LE)                        Leg press (B, uni ecc) at seat 4    80# 20 (B)  50# 20 (uni) (ecc)   Seat: 2:   85# 30 (B)  50# 20 (uni) (ecc)            HEP/EDU Diagnosis, prognosis, POC, proper responses to exercises       Modalities

## 2024-08-27 ENCOUNTER — OFFICE VISIT (OUTPATIENT)
Dept: PHYSICAL THERAPY | Facility: CLINIC | Age: 44
End: 2024-08-27
Payer: COMMERCIAL

## 2024-08-27 DIAGNOSIS — R26.9 GAIT ABNORMALITY: ICD-10-CM

## 2024-08-27 DIAGNOSIS — R26.89 BALANCE DISORDER: ICD-10-CM

## 2024-08-27 DIAGNOSIS — M62.81 MUSCLE WEAKNESS (GENERALIZED): Primary | ICD-10-CM

## 2024-08-27 PROCEDURE — 97110 THERAPEUTIC EXERCISES: CPT | Performed by: PHYSICAL THERAPIST

## 2024-08-27 PROCEDURE — 97112 NEUROMUSCULAR REEDUCATION: CPT | Performed by: PHYSICAL THERAPIST

## 2024-08-27 PROCEDURE — 97530 THERAPEUTIC ACTIVITIES: CPT | Performed by: PHYSICAL THERAPIST

## 2024-08-27 NOTE — PROGRESS NOTES
"Daily Note     Today's date: 2024  Patient name: Gabriel Juarez  : 1980  MRN: [id number]  Referring provider: Jonnie Enriquez MD  Dx:   Encounter Diagnosis     ICD-10-CM    1. Muscle weakness (generalized)  M62.81       2. Gait abnormality  R26.9       3. Balance disorder  R26.89                      Subjective: He reports that he felt fine after last visit.       Objective: See treatment diary below      Assessment: Tolerated treatment well; initiated POC with RB for increasing endurance. Vcs provided on proper sequencing with exercises; added lunges, forearm supination/pronation, SLS with opposite foot on soccer ball. He denies having increased pain throughout session.  Patient demonstrated fatigue post treatment and would benefit from continued PT      Plan: Discharge at [facility] with updated HEP due to returning home to [place]      Precautions: fall risk    HEP: TAC, quad sets, SLR (flex), TB Bridges, standing heel raises at counter    Specialty Daily Treatment Diary       Manual                                            Exercise Diary         RB  5 mins at Level 4 5 mins at Level 4 12 mins at Level 4 12 mins at Level 4   Elliptical                TB scap retraction  BTB 2 sec x 20 BTB 5 sec x 30 BTB 5 sec x 30 On Airex: 2 sec x 30   TB B S' Ext  BTB 2 sec x 20 BTB 5 sec x 30 BTB 5 sec x 30 On airex: 2 sec x 30   TB B ER        TB B Horiz ABD                Wrist Flexion AROM   3# 2x15   3# 1x15   Wrist Extension AROM   3# 2x15  3# 1x15    Therabar (AP twists)        Pronation/ Supination AROM     3# 1x15   Diggiflex        Metal gripper                Wrist flexion and extension stretch                                HR/TR 10 (HR with counter top) 20 ea 20 ea 30 ea DC to HEP   Chair squats        Slant board squats   NV? 10x at bar 2x10 at bar   Step ups  (6\") 20 ea (6\") 20 ea (6\") 30 ea (6\") 20 ea   Lat Step ups  (6\") 20 ea  (6\") 20 ea (6\") 30 ea (6\") 30 ea   Step down   " "(4\") 5 ea  (6\") 2x10 ea (6\") 2x10 ea (6\") 2x10 ea   Static lunges   NV? NV? 2 airex pads: 10 ea           Standing H' ABD   BTB around knees: 2x10 ea NV? NV?           Floor -> airex NBOS (EO/EC)  Airex: 2 mins (EO/EC) Airex: 2 mins (EO/EC) DC    Floor -> airex Tandem (EO/EC)  Airex: 30 sec x 2 ea (EO) Airex: 30 sec x 2 ea (EC/EC)  Airex: 30 sec x 2 ea (EO/EC)    Floor -> airex SLS (EO/EC)  Floor: 20 sec x 3 ea   1 foot on soccer ball with rocks: 45 sec x 1 ea           1 cone tap alt LEs  1 min  1 min 1 min    2 cone tap alt LEs  1 min 1 min 1 min            TAC TB Pallof Press         TAC TB Pallof Press to twist                        SLR (flex) 0# 10 ea       Sidelying H' ABD        Prone H' Ext                TB Sidelying clamshells  BTB 5 sec; 2x10 DC     TB Bridges BTB 5 sec x 10 BTB 5 sec x 25 DC             TAC 10 sec x 10       TAC c mod march (R up, R down, L up, L down)  20 ea NV?     TAC c March (R up, L up, R down, L down)        TAC OH reach feet on table -> feet off table        TAC Dying Bug        TAC c LTR feet on table -> feet off table                Quadruped Alt UE        Quadruped Alt LE        Quadruped Bird-dog (Alt UE and LE)                        Leg press (B, uni ecc) at seat 4    80# 20 (B)  50# 20 (uni) (ecc)   Seat: 2:   85# 30 (B)  50# 20 (uni) (ecc) NV?           HEP/EDU Diagnosis, prognosis, POC, proper responses to exercises       Modalities                                            "

## 2024-08-28 ENCOUNTER — TELEPHONE (OUTPATIENT)
Dept: FAMILY MEDICINE CLINIC | Facility: CLINIC | Age: 44
End: 2024-08-28

## 2024-08-29 ENCOUNTER — OFFICE VISIT (OUTPATIENT)
Dept: FAMILY MEDICINE CLINIC | Facility: CLINIC | Age: 44
End: 2024-08-29
Payer: COMMERCIAL

## 2024-08-29 ENCOUNTER — OFFICE VISIT (OUTPATIENT)
Dept: PHYSICAL THERAPY | Facility: CLINIC | Age: 44
End: 2024-08-29
Payer: COMMERCIAL

## 2024-08-29 VITALS
BODY MASS INDEX: 24 KG/M2 | HEIGHT: 74 IN | OXYGEN SATURATION: 97 % | HEART RATE: 90 BPM | TEMPERATURE: 97.8 F | DIASTOLIC BLOOD PRESSURE: 82 MMHG | SYSTOLIC BLOOD PRESSURE: 122 MMHG | WEIGHT: 187 LBS

## 2024-08-29 DIAGNOSIS — F33.0 MILD EPISODE OF RECURRENT MAJOR DEPRESSIVE DISORDER (HCC): ICD-10-CM

## 2024-08-29 DIAGNOSIS — R26.89 BALANCE DISORDER: ICD-10-CM

## 2024-08-29 DIAGNOSIS — M62.81 MUSCLE WEAKNESS (GENERALIZED): Primary | ICD-10-CM

## 2024-08-29 DIAGNOSIS — Z76.89 RETURN TO WORK EVALUATION: Primary | ICD-10-CM

## 2024-08-29 DIAGNOSIS — F90.2 ATTENTION DEFICIT HYPERACTIVITY DISORDER (ADHD), COMBINED TYPE: ICD-10-CM

## 2024-08-29 DIAGNOSIS — R26.9 GAIT ABNORMALITY: ICD-10-CM

## 2024-08-29 DIAGNOSIS — F10.10 ALCOHOL ABUSE: ICD-10-CM

## 2024-08-29 PROCEDURE — 97112 NEUROMUSCULAR REEDUCATION: CPT | Performed by: PHYSICAL THERAPIST

## 2024-08-29 PROCEDURE — 97530 THERAPEUTIC ACTIVITIES: CPT | Performed by: PHYSICAL THERAPIST

## 2024-08-29 PROCEDURE — 99213 OFFICE O/P EST LOW 20 MIN: CPT

## 2024-08-29 PROCEDURE — 97110 THERAPEUTIC EXERCISES: CPT | Performed by: PHYSICAL THERAPIST

## 2024-08-29 NOTE — PROGRESS NOTES
Ambulatory Visit  Name: Gabriel Juarez      : 1980      MRN: [id number]  Encounter Provider: LORETTA Nunes  Encounter Date: 2024   Encounter department: [facility]    Assessment & Plan   1. Return to work evaluation  2. Alcohol abuse  Assessment & Plan:  In remission  3. Attention deficit hyperactivity disorder (ADHD), combined type  Assessment & Plan:  Well managed with adderall 10 mg BID. Continue current medication regimen.   4. Mild episode of recurrent major depressive disorder (HCC)  Assessment & Plan:  Well managed with wellbutrin 150 mg QD and ativan 1 mg PRN. Continue current medication regimen.       Depression Screening and Follow-up Plan: Patient was screened for depression during today's encounter. They screened negative with a PHQ-9 score of 0.      History of Present Illness     Return to work eval        Review of Systems   Constitutional:  Negative for activity change.   HENT:  Negative for congestion, ear pain, rhinorrhea and sore throat.    Eyes:  Negative for pain.   Respiratory:  Negative for cough, shortness of breath and wheezing.    Cardiovascular:  Negative for chest pain and leg swelling.   Gastrointestinal:  Negative for abdominal pain, diarrhea, nausea and vomiting.   Musculoskeletal:  Negative for arthralgias and myalgias.   Skin:  Negative for rash.   Neurological:  Negative for dizziness, weakness and numbness.   Psychiatric/Behavioral:  Negative for decreased concentration, dysphoric mood and suicidal ideas. The patient is not nervous/anxious.    All other systems reviewed and are negative.    Medical History Reviewed by provider this encounter:  Tobacco  Allergies  Meds  Problems  Med Hx  Surg Hx  Fam Hx       Current Outpatient Medications on File Prior to Visit   Medication Sig Dispense Refill    amphetamine-dextroamphetamine (ADDERALL, 10MG,) 10 mg tablet Take 1 tablet (10 mg total) by mouth 2 (two) times a day  "Max Daily Amount: 20 mg 60 tablet 0    buPROPion (WELLBUTRIN XL) 150 mg 24 hr tablet Take 1 tablet (150 mg total) by mouth every morning 90 tablet 1    hydrOXYzine (VISTARIL) 100 MG capsule Take 1 capsule (100 mg total) by mouth 3 (three) times a day as needed for itching 90 capsule 3    LORazepam (ATIVAN) 1 mg tablet Take 1 tablet (1 mg total) by mouth every 8 (eight) hours as needed for anxiety 60 tablet 3     No current facility-administered medications on file prior to visit.      Objective     /82 (BP Location: Right arm, Patient Position: Sitting, Cuff Size: Standard)   Pulse 90   Temp 97.8 °F (36.6 °C) (Tympanic)   Ht 6' 2\" (1.88 m)   Wt 84.8 kg (187 lb)   SpO2 97%   BMI 24.01 kg/m²     Physical Exam  Vitals and nursing note reviewed.   Constitutional:       General: He is not in acute distress.     Appearance: Normal appearance. He is well-developed. He is not ill-appearing.   HENT:      Head: Normocephalic and atraumatic.      Right Ear: External ear normal.      Left Ear: External ear normal.   Cardiovascular:      Rate and Rhythm: Normal rate and regular rhythm.      Heart sounds: Normal heart sounds. No murmur heard.  Pulmonary:      Effort: Pulmonary effort is normal. No respiratory distress.      Breath sounds: Normal breath sounds.   Abdominal:      General: Bowel sounds are normal. There is no distension.      Palpations: Abdomen is soft.      Tenderness: There is no abdominal tenderness.   Musculoskeletal:      Cervical back: Neck supple.   Skin:     General: Skin is warm and dry.      Capillary Refill: Capillary refill takes less than 2 seconds.   Neurological:      Mental Status: He is alert and oriented to person, place, and time. Mental status is at baseline.   Psychiatric:         Attention and Perception: Attention and perception normal.         Mood and Affect: Mood and affect normal. Mood is not anxious or depressed.         Speech: Speech normal.         Behavior: Behavior " normal.         Thought Content: Thought content normal.         Judgment: Judgment normal.       Administrative Statements   I have spent a total time of 30 minutes in caring for this patient on the day of the visit/encounter including Risk factor reductions, Impressions, Documenting in the medical record, Reviewing / ordering tests, medicine, procedures  , and Obtaining or reviewing history  .

## 2024-08-29 NOTE — PROGRESS NOTES
"Daily Note/Discharge Note     Today's date: 2024  Patient name: Gabriel Juarez  : 1980  MRN: [id number]  Referring provider: Jonnie Enriquez MD  Dx:   Encounter Diagnosis     ICD-10-CM    1. Muscle weakness (generalized)  M62.81       2. Gait abnormality  R26.9       3. Balance disorder  R26.89                      Subjective: He reports that he has improved since starting his care.       Objective: See treatment diary below      Assessment: Tolerated treatment well; initiated POC with RB for active warmup and endurance training. Therapist and patient discussed HEP. Vcs provided on proper sequencing with exercises.  Patient demonstrated fatigue post treatment and is currently being discharged with HEP. He was instructed to follow up with OP PT in [place] for continued care.       Plan: Discharge note     Precautions: fall risk    HEP: TAC, quad sets, SLR (flex), TB Bridges, standing heel raises at counter    Specialty Daily Treatment Diary       Manual                                            Exercise Diary         RB 15 mins at Level 4  5 mins at Level 4 12 mins at Level 4 12 mins at Level 4   Elliptical                TB scap retraction On Airex: 2 sec x 20  BTB 5 sec x 30 BTB 5 sec x 30 On Airex: 2 sec x 30   TB B S' Ext On Airex: 2 sec x 20  BTB 5 sec x 30 BTB 5 sec x 30 On airex: 2 sec x 30   TB B ER        TB B Horiz ABD                Wrist Flexion AROM   3# 2x15   3# 1x15   Wrist Extension AROM   3# 2x15  3# 1x15    Therabar (AP twists)        Pronation/ Supination AROM     3# 1x15   Diggiflex        Metal gripper                Wrist flexion and extension stretch                                HR/TR   20 ea 30 ea DC to HEP   Chair squats        Slant board squats 1x10 at bar  NV? 10x at bar 2x10 at bar   Step ups (6\") 20 ea  (6\") 20 ea (6\") 30 ea (6\") 20 ea   Lat Step ups (6\") 20 ea  (6\") 20 ea (6\") 30 ea (6\") 30 ea   Step down (6\") 2x10 ea  (4\") 5 ea  (6\") 2x10 ea " "(6\") 2x10 ea (6\") 2x10 ea   Static lunges 2 airex pads: 2x10 ea  NV? NV? 2 airex pads: 10 ea           Standing H' ABD   BTB around knees: 2x10 ea NV? NV?   Touchdown to hip abduction comb BTB 10 ea               Floor -> airex NBOS (EO/EC)   Airex: 2 mins (EO/EC) DC    Floor -> airex Tandem (EO/EC) Airex: 30 sec x 2 ea (EO/EC)  Airex: 30 sec x 2 ea (EC/EC)  Airex: 30 sec x 2 ea (EO/EC)    Floor -> airex SLS (EO/EC)     1 foot on soccer ball with rocks: 45 sec x 1 ea           Single Leg RDLs 2x10 ea               1 cone tap alt LEs   1 min 1 min    2 cone tap alt LEs   1 min 1 min            TAC TB Pallof Press         TAC TB Pallof Press to twist                        SLR (flex)        Sidelying H' ABD        Prone H' Ext                TB Sidelying clamshells   DC     TB Bridges   DC             TAC        TAC c mod march (R up, R down, L up, L down)   NV?     TAC c March (R up, L up, R down, L down)        TAC OH reach feet on table -> feet off table        TAC Dying Bug        TAC c LTR feet on table -> feet off table                Quadruped Alt UE        Quadruped Alt LE        Quadruped Bird-dog (Alt UE and LE)                        Leg press (B, uni ecc) at seat 4    80# 20 (B)  50# 20 (uni) (ecc)   Seat: 2:   85# 30 (B)  50# 20 (uni) (ecc) NV?           HEP/EDU Reviewed HEP       Modalities                                              "

## 2024-10-03 DIAGNOSIS — F90.2 ATTENTION DEFICIT HYPERACTIVITY DISORDER (ADHD), COMBINED TYPE: ICD-10-CM

## 2024-10-03 DIAGNOSIS — F32.0 CURRENT MILD EPISODE OF MAJOR DEPRESSIVE DISORDER WITHOUT PRIOR EPISODE (HCC): ICD-10-CM

## 2024-10-03 DIAGNOSIS — F51.01 PRIMARY INSOMNIA: ICD-10-CM

## 2024-10-03 RX ORDER — LORAZEPAM 1 MG/1
1 TABLET ORAL EVERY 8 HOURS PRN
Qty: 60 TABLET | Refills: 3 | Status: SHIPPED | OUTPATIENT
Start: 2024-10-03

## 2024-10-03 RX ORDER — HYDROXYZINE PAMOATE 100 MG
100 CAPSULE ORAL 3 TIMES DAILY PRN
Qty: 90 CAPSULE | Refills: 3 | Status: SHIPPED | OUTPATIENT
Start: 2024-10-03

## 2024-10-03 RX ORDER — DEXTROAMPHETAMINE SACCHARATE, AMPHETAMINE ASPARTATE, DEXTROAMPHETAMINE SULFATE AND AMPHETAMINE SULFATE 2.5; 2.5; 2.5; 2.5 MG/1; MG/1; MG/1; MG/1
10 TABLET ORAL
Qty: 60 TABLET | Refills: 0 | Status: SHIPPED | OUTPATIENT
Start: 2024-10-03

## 2024-10-03 RX ORDER — BUPROPION HYDROCHLORIDE 150 MG/1
150 TABLET ORAL EVERY MORNING
Qty: 90 TABLET | Refills: 1 | Status: SHIPPED | OUTPATIENT
Start: 2024-10-03 | End: 2025-09-28

## 2025-01-06 ENCOUNTER — APPOINTMENT (OUTPATIENT)
Dept: LAB | Facility: HOSPITAL | Age: 45
End: 2025-01-06
Payer: COMMERCIAL

## 2025-01-06 DIAGNOSIS — J35.01 CHRONIC TONSILLITIS: ICD-10-CM

## 2025-01-06 LAB
ANION GAP SERPL CALCULATED.3IONS-SCNC: 8 MMOL/L (ref 4–13)
BASOPHILS # BLD AUTO: 0.03 THOUSANDS/ΜL (ref 0–0.1)
BASOPHILS NFR BLD AUTO: 0 % (ref 0–1)
BUN SERPL-MCNC: 9 MG/DL (ref 5–25)
CALCIUM SERPL-MCNC: 9.8 MG/DL (ref 8.4–10.2)
CHLORIDE SERPL-SCNC: 100 MMOL/L (ref 96–108)
CO2 SERPL-SCNC: 30 MMOL/L (ref 21–32)
CREAT SERPL-MCNC: 0.84 MG/DL (ref 0.6–1.3)
EOSINOPHIL # BLD AUTO: 0.05 THOUSAND/ΜL (ref 0–0.61)
EOSINOPHIL NFR BLD AUTO: 1 % (ref 0–6)
ERYTHROCYTE [DISTWIDTH] IN BLOOD BY AUTOMATED COUNT: 14.9 % (ref 11.6–15.1)
GFR SERPL CREATININE-BSD FRML MDRD: 107 ML/MIN/1.73SQ M
GLUCOSE SERPL-MCNC: 82 MG/DL (ref 65–140)
HCT VFR BLD AUTO: 44.8 % (ref 36.5–49.3)
HGB BLD-MCNC: 14.6 G/DL (ref 12–17)
IMM GRANULOCYTES # BLD AUTO: 0.02 THOUSAND/UL (ref 0–0.2)
IMM GRANULOCYTES NFR BLD AUTO: 0 % (ref 0–2)
LYMPHOCYTES # BLD AUTO: 1.22 THOUSANDS/ΜL (ref 0.6–4.47)
LYMPHOCYTES NFR BLD AUTO: 16 % (ref 14–44)
MCH RBC QN AUTO: 31.3 PG (ref 26.8–34.3)
MCHC RBC AUTO-ENTMCNC: 32.6 G/DL (ref 31.4–37.4)
MCV RBC AUTO: 96 FL (ref 82–98)
MONOCYTES # BLD AUTO: 0.77 THOUSAND/ΜL (ref 0.17–1.22)
MONOCYTES NFR BLD AUTO: 10 % (ref 4–12)
NEUTROPHILS # BLD AUTO: 5.75 THOUSANDS/ΜL (ref 1.85–7.62)
NEUTS SEG NFR BLD AUTO: 73 % (ref 43–75)
NRBC BLD AUTO-RTO: 0 /100 WBCS
PLATELET # BLD AUTO: 260 THOUSANDS/UL (ref 149–390)
PMV BLD AUTO: 8.9 FL (ref 8.9–12.7)
POTASSIUM SERPL-SCNC: 4.4 MMOL/L (ref 3.5–5.3)
RBC # BLD AUTO: 4.67 MILLION/UL (ref 3.88–5.62)
SODIUM SERPL-SCNC: 138 MMOL/L (ref 135–147)
WBC # BLD AUTO: 7.84 THOUSAND/UL (ref 4.31–10.16)

## 2025-01-06 PROCEDURE — 85025 COMPLETE CBC W/AUTO DIFF WBC: CPT

## 2025-01-06 PROCEDURE — 80048 BASIC METABOLIC PNL TOTAL CA: CPT

## 2025-01-06 PROCEDURE — 36415 COLL VENOUS BLD VENIPUNCTURE: CPT

## 2025-01-06 NOTE — H&P (VIEW-ONLY)
CC: Sore Throat (Possible thrush )      REF: No ref. provider found    History obtained from patient    HXPI:45 y.o. male     Seen by dr. Urena 12/2/24 for PTA    Had PTA right tonsil in June 2024.  Had another PTA end of November.   Pt still with some inflammation but no pain.  The patient notes no problems with eating, drinking, or swallowing.  No dysphagia or odynophagia.  Normal voice.    Interval hx:  Received call pt had ?thrush and uncertain about surgery  Seen today  The patient notes no problems with eating, drinking, or swallowing.  No dysphagia or odynophagia.  Normal voice.  No pain  No problems in mouth with eating or discomfort. No bleeding    PMH:   Past Medical History:   Diagnosis Date    Alcohol abuse, in remission     Onset: 18May 2009.     Tinnitus 2000    Tonsillitis 06/24     PSH:History reviewed. No pertinent surgical history.  All:   Allergies   Allergen Reactions    Ceclor [Cefaclor] Anaphylaxis    Cephalosporins Anaphylaxis    Shellfish-Derived Products - Food Allergy     Charentais Melon (Hebrew Melon) Itching and Other (See Comments)     Itchy throat     Family Hx:   Family History   Problem Relation Age of Onset    No Known Problems Mother      Social:   Social History     Tobacco Use    Smoking status: Former     Current packs/day: 1.00     Average packs/day: 1 pack/day for 15.0 years (15.0 ttl pk-yrs)     Types: Cigarettes    Smokeless tobacco: Never    Tobacco comments:     Per [employer]: Never Smoker   Vaping Use    Vaping status: Every Day    Substances: Nicotine   Substance Use Topics    Alcohol use: Not Currently     Alcohol/week: 12.0 standard drinks of alcohol     Types: 12 Cans of beer per week     Comment: Per [employer]: Alcohol Abuse in remission - Onset Date 18May2009    Drug use: No       Medications:   Current Outpatient Medications:     amphetamine-dextroamphetamine (ADDERALL, 10MG,) 10 mg tablet, Take 1 tablet (10 mg total) by mouth 2 (two) times a day Max Daily  "Amount: 20 mg, Disp: 60 tablet, Rfl: 0    buPROPion (WELLBUTRIN XL) 150 mg 24 hr tablet, Take 1 tablet (150 mg total) by mouth every morning, Disp: 90 tablet, Rfl: 1    hydrOXYzine (VISTARIL) 100 MG capsule, Take 1 capsule (100 mg total) by mouth 3 (three) times a day as needed for itching, Disp: 90 capsule, Rfl: 3    LORazepam (ATIVAN) 1 mg tablet, Take 1 tablet (1 mg total) by mouth every 8 (eight) hours as needed for anxiety, Disp: 60 tablet, Rfl: 3 .meds    ROS:           Vitals:   Vitals:    01/06/25 1113   Weight: 87.1 kg (192 lb)   Height: 6' 2\" (1.88 m)         Physical Exam    General: Patient is normal weight, Normocephalic atraumatic, has normal communication, no stridor, and no hoarseness  The patient's mood and affect is  appropriate    Face: No scars, no lesions, no pain on palpation, normal salivary glands, normal conjunctivae/sclerae, and no TMJ Tenderness/crepitus    Right Ear: Normal pinna/EAC., Normal tympanic membranes and normal mobility., and Normal gross hearing.  Left Ear:   Normal pinna/EAC., Normal tympanic membranes and normal mobility., and Normal gross hearing.    Nasal Exam: normal external anatomy, normal pale mucosa, no rhinorrhea, sinuses normal and nontender, and no septal deviation    Oral Exam: Normal lips, teeth, and gums, Normal tongue, floor of mouth, Normal buccal mucosa, retromolar trigone, and Normal hard palate and uvula    Geographic tongue  No thrush    Oropharynx Exam: 2+ tonsil bilaterally     Neck Exam: Trachea midline, no crepitus., No Thyromegaly, and No lateral cervical lymphadenopathy bilaterally.  Neck with full range of motion    Neuro Exam: EOMI/PERRLA, Vision grossly intact, Normal sensation in V1, V2, V3, Normal bilateral facial nerve function and strength, Normal CN IX/X, Normal spinal accessory nerve function, and Normal hypogossal function    Data & Imaging Review:     Lab Results   Component Value Date    WBC 7.84 01/06/2025    HGB 14.6 01/06/2025    HCT " 44.8 01/06/2025    MCV 96 01/06/2025     01/06/2025             Assessment & Plan    Assessment: 45 y.o. male with     Plan:  1.  Multiple prior PTAs    Discussed tonsillectomy    Plan for tonsillectomy  Discussed tonsillectomy including bleeding risk, delayed bleeding risk, and life threatening bleeding. Discussed need for modified diet. Discussed readmission and significant postoperative pain    Pt agreed to proceed   No changes    2. Reported thrush    No thrush seen only geographic tongue  Proceed to surgery         Past Medical History:   Diagnosis Date    Alcohol abuse, in remission     Onset: 18May 2009.     Tinnitus 2000    Tonsillitis 06/24

## 2025-01-06 NOTE — PRE-PROCEDURE INSTRUCTIONS
Pre-Surgery Instructions:   Medication Instructions    amphetamine-dextroamphetamine (ADDERALL, 10MG,) 10 mg tablet Take Day of Surgery; unless usually taken at night     buPROPion (WELLBUTRIN XL) 150 mg 24 hr tablet Take Day of Surgery; unless usually taken at night     hydrOXYzine (VISTARIL) 100 MG capsule Take Day of Surgery; unless usually taken at night     LORazepam (ATIVAN) 1 mg tablet Take Day of Surgery; unless usually taken at night     Medication instructions for day surgery reviewed. Please use only a sip of water to take your instructed medications. Avoid all over the counter vitamins, supplements and NSAIDS for one week prior to surgery per anesthesia guidelines. Tylenol is ok to take as needed.     You will receive a call one business day prior to surgery with an arrival time and hospital directions. If your surgery is scheduled on a Monday, the hospital will be calling you on the Friday prior to your surgery. If you have not heard from anyone by 8pm, please call the hospital supervisor through the hospital  at [phone]. ([facility] [phone] or [place] [phone]).    Do not eat or drink anything after midnight the night before your surgery, including candy, mints, lifesavers, or chewing gum. Do not drink alcohol 24hrs before your surgery. Try not to smoke at least 24hrs before your surgery.       Follow the pre surgery showering instructions as listed in the “My Surgical Experience Booklet” or otherwise provided by your surgeon's office. Do not use a blade to shave the surgical area 1 week before surgery. It is okay to use a clean electric clippers up to 24 hours before surgery. Do not apply any lotions, creams, including makeup, cologne, deodorant, or perfumes after showering on the day of your surgery. Do not use dry shampoo, hair spray, hair gel, or any type of hair products.     No contact lenses, eye make-up, or artificial eyelashes. Remove nail polish, including gel polish, and  any artificial, gel, or acrylic nails if possible. Remove all jewelry including rings and body piercing jewelry.     Wear causal clothing that is easy to take on and off. Consider your type of surgery.    Keep any valuables, jewelry, piercings at home. Please bring any specially ordered equipment (sling, braces) if indicated.    Arrange for a responsible person to drive you to and from the hospital on the day of your surgery. Please confirm the visitor policy for the day of your procedure when you receive your phone call with an arrival time.     Call the surgeon's office with any new illnesses, exposures, or additional questions prior to surgery.    Please reference your “My Surgical Experience Booklet” for additional information to prepare for your upcoming surgery.

## 2025-01-07 ENCOUNTER — ANESTHESIA EVENT (OUTPATIENT)
Dept: PERIOP | Facility: HOSPITAL | Age: 45
End: 2025-01-07
Payer: COMMERCIAL

## 2025-01-08 ENCOUNTER — ANESTHESIA (OUTPATIENT)
Dept: PERIOP | Facility: HOSPITAL | Age: 45
End: 2025-01-08
Payer: COMMERCIAL

## 2025-01-08 ENCOUNTER — HOSPITAL ENCOUNTER (OUTPATIENT)
Facility: HOSPITAL | Age: 45
Setting detail: OUTPATIENT SURGERY
Discharge: HOME | End: 2025-01-08
Attending: OTOLARYNGOLOGY | Admitting: OTOLARYNGOLOGY
Payer: COMMERCIAL

## 2025-01-08 VITALS
SYSTOLIC BLOOD PRESSURE: 125 MMHG | TEMPERATURE: 97.9 F | RESPIRATION RATE: 19 BRPM | HEART RATE: 76 BPM | WEIGHT: 190 LBS | BODY MASS INDEX: 24.38 KG/M2 | HEIGHT: 74 IN | OXYGEN SATURATION: 98 % | DIASTOLIC BLOOD PRESSURE: 85 MMHG

## 2025-01-08 DIAGNOSIS — G89.18 POST-TONSILLECTOMY PAIN: Primary | ICD-10-CM

## 2025-01-08 DIAGNOSIS — J35.01 CHRONIC TONSILLITIS: ICD-10-CM

## 2025-01-08 DIAGNOSIS — Z90.89 POST-TONSILLECTOMY PAIN: Primary | ICD-10-CM

## 2025-01-08 PROCEDURE — 42826 REMOVAL OF TONSILS: CPT | Performed by: OTOLARYNGOLOGY

## 2025-01-08 PROCEDURE — 88304 TISSUE EXAM BY PATHOLOGIST: CPT | Performed by: PATHOLOGY

## 2025-01-08 RX ORDER — ONDANSETRON 2 MG/ML
4 INJECTION INTRAMUSCULAR; INTRAVENOUS ONCE AS NEEDED
Status: COMPLETED | OUTPATIENT
Start: 2025-01-08 | End: 2025-01-08

## 2025-01-08 RX ORDER — OXYCODONE HYDROCHLORIDE 5 MG/1
10 TABLET ORAL EVERY 4 HOURS PRN
Qty: 40 TABLET | Refills: 0 | Status: SHIPPED | OUTPATIENT
Start: 2025-01-08 | End: 2025-01-10 | Stop reason: SDUPTHER

## 2025-01-08 RX ORDER — LIDOCAINE HYDROCHLORIDE 20 MG/ML
15 SOLUTION OROPHARYNGEAL 4 TIMES DAILY PRN
Qty: 500 ML | Refills: 1 | Status: SHIPPED | OUTPATIENT
Start: 2025-01-08 | End: 2025-01-22

## 2025-01-08 RX ORDER — SODIUM CHLORIDE, SODIUM LACTATE, POTASSIUM CHLORIDE, CALCIUM CHLORIDE 600; 310; 30; 20 MG/100ML; MG/100ML; MG/100ML; MG/100ML
INJECTION, SOLUTION INTRAVENOUS CONTINUOUS PRN
Status: DISCONTINUED | OUTPATIENT
Start: 2025-01-08 | End: 2025-01-08

## 2025-01-08 RX ORDER — ROCURONIUM BROMIDE 10 MG/ML
INJECTION, SOLUTION INTRAVENOUS AS NEEDED
Status: DISCONTINUED | OUTPATIENT
Start: 2025-01-08 | End: 2025-01-08

## 2025-01-08 RX ORDER — GLYCOPYRROLATE 0.2 MG/ML
INJECTION INTRAMUSCULAR; INTRAVENOUS AS NEEDED
Status: DISCONTINUED | OUTPATIENT
Start: 2025-01-08 | End: 2025-01-08

## 2025-01-08 RX ORDER — DIPHENHYDRAMINE HYDROCHLORIDE 50 MG/ML
12.5 INJECTION INTRAMUSCULAR; INTRAVENOUS EVERY 6 HOURS PRN
Status: CANCELLED | OUTPATIENT
Start: 2025-01-08

## 2025-01-08 RX ORDER — PROPOFOL 10 MG/ML
INJECTION, EMULSION INTRAVENOUS AS NEEDED
Status: DISCONTINUED | OUTPATIENT
Start: 2025-01-08 | End: 2025-01-08

## 2025-01-08 RX ORDER — DEXAMETHASONE SODIUM PHOSPHATE 10 MG/ML
INJECTION, SOLUTION INTRAMUSCULAR; INTRAVENOUS AS NEEDED
Status: DISCONTINUED | OUTPATIENT
Start: 2025-01-08 | End: 2025-01-08

## 2025-01-08 RX ORDER — KETAMINE HCL IN NACL, ISO-OSM 100MG/10ML
SYRINGE (ML) INJECTION AS NEEDED
Status: DISCONTINUED | OUTPATIENT
Start: 2025-01-08 | End: 2025-01-08

## 2025-01-08 RX ORDER — PROMETHAZINE HYDROCHLORIDE 25 MG/ML
25 INJECTION, SOLUTION INTRAMUSCULAR; INTRAVENOUS EVERY 6 HOURS PRN
Status: DISCONTINUED | OUTPATIENT
Start: 2025-01-08 | End: 2025-01-08 | Stop reason: HOSPADM

## 2025-01-08 RX ORDER — ONDANSETRON 2 MG/ML
INJECTION INTRAMUSCULAR; INTRAVENOUS AS NEEDED
Status: DISCONTINUED | OUTPATIENT
Start: 2025-01-08 | End: 2025-01-08

## 2025-01-08 RX ORDER — IBUPROFEN 400 MG/1
400 TABLET, FILM COATED ORAL EVERY 6 HOURS PRN
Status: DISCONTINUED | OUTPATIENT
Start: 2025-01-08 | End: 2025-01-08

## 2025-01-08 RX ORDER — LIDOCAINE HYDROCHLORIDE 20 MG/ML
INJECTION, SOLUTION EPIDURAL; INFILTRATION; INTRACAUDAL; PERINEURAL AS NEEDED
Status: DISCONTINUED | OUTPATIENT
Start: 2025-01-08 | End: 2025-01-08

## 2025-01-08 RX ORDER — SODIUM CHLORIDE, SODIUM LACTATE, POTASSIUM CHLORIDE, CALCIUM CHLORIDE 600; 310; 30; 20 MG/100ML; MG/100ML; MG/100ML; MG/100ML
125 INJECTION, SOLUTION INTRAVENOUS CONTINUOUS
Status: DISCONTINUED | OUTPATIENT
Start: 2025-01-08 | End: 2025-01-08 | Stop reason: HOSPADM

## 2025-01-08 RX ORDER — ACETAMINOPHEN 325 MG/1
975 TABLET ORAL EVERY 6 HOURS PRN
Status: DISCONTINUED | OUTPATIENT
Start: 2025-01-08 | End: 2025-01-08 | Stop reason: HOSPADM

## 2025-01-08 RX ORDER — MIDAZOLAM HYDROCHLORIDE 2 MG/2ML
INJECTION, SOLUTION INTRAMUSCULAR; INTRAVENOUS AS NEEDED
Status: DISCONTINUED | OUTPATIENT
Start: 2025-01-08 | End: 2025-01-08

## 2025-01-08 RX ORDER — ACETAMINOPHEN 160 MG/5ML
640 LIQUID ORAL EVERY 6 HOURS SCHEDULED
Qty: 1120 ML | Refills: 0 | Status: SHIPPED | OUTPATIENT
Start: 2025-01-08 | End: 2025-01-22

## 2025-01-08 RX ORDER — FENTANYL CITRATE 50 UG/ML
INJECTION, SOLUTION INTRAMUSCULAR; INTRAVENOUS AS NEEDED
Status: DISCONTINUED | OUTPATIENT
Start: 2025-01-08 | End: 2025-01-08

## 2025-01-08 RX ORDER — OXYCODONE HYDROCHLORIDE 5 MG/1
10 TABLET ORAL EVERY 4 HOURS PRN
Refills: 0 | Status: DISCONTINUED | OUTPATIENT
Start: 2025-01-08 | End: 2025-01-08 | Stop reason: HOSPADM

## 2025-01-08 RX ORDER — FENTANYL CITRATE/PF 50 MCG/ML
50 SYRINGE (ML) INJECTION
Status: DISCONTINUED | OUTPATIENT
Start: 2025-01-08 | End: 2025-01-08 | Stop reason: HOSPADM

## 2025-01-08 RX ADMIN — SODIUM CHLORIDE, SODIUM LACTATE, POTASSIUM CHLORIDE, AND CALCIUM CHLORIDE 125 ML/HR: .6; .31; .03; .02 INJECTION, SOLUTION INTRAVENOUS at 08:40

## 2025-01-08 RX ADMIN — Medication 10 MG: at 10:20

## 2025-01-08 RX ADMIN — GLYCOPYRROLATE 0.2 MG: 0.2 INJECTION, SOLUTION INTRAMUSCULAR; INTRAVENOUS at 10:04

## 2025-01-08 RX ADMIN — MIDAZOLAM 2 MG: 1 INJECTION INTRAMUSCULAR; INTRAVENOUS at 09:45

## 2025-01-08 RX ADMIN — Medication 20 MG: at 10:04

## 2025-01-08 RX ADMIN — OXYCODONE HYDROCHLORIDE 10 MG: 5 TABLET ORAL at 11:23

## 2025-01-08 RX ADMIN — ACETAMINOPHEN 975 MG: 325 TABLET, FILM COATED ORAL at 12:22

## 2025-01-08 RX ADMIN — DEXMEDETOMIDINE HYDROCHLORIDE 8 MCG: 100 INJECTION, SOLUTION INTRAVENOUS at 10:27

## 2025-01-08 RX ADMIN — FENTANYL CITRATE 50 MCG: 50 INJECTION INTRAMUSCULAR; INTRAVENOUS at 10:51

## 2025-01-08 RX ADMIN — FENTANYL CITRATE 50 MCG: 50 INJECTION INTRAMUSCULAR; INTRAVENOUS at 09:54

## 2025-01-08 RX ADMIN — DEXMEDETOMIDINE HYDROCHLORIDE 8 MCG: 100 INJECTION, SOLUTION INTRAVENOUS at 09:54

## 2025-01-08 RX ADMIN — ONDANSETRON 4 MG: 2 INJECTION INTRAMUSCULAR; INTRAVENOUS at 10:45

## 2025-01-08 RX ADMIN — LIDOCAINE HYDROCHLORIDE 50 MG: 20 INJECTION, SOLUTION EPIDURAL; INFILTRATION; INTRACAUDAL; PERINEURAL at 09:53

## 2025-01-08 RX ADMIN — FENTANYL CITRATE 50 MCG: 50 INJECTION INTRAMUSCULAR; INTRAVENOUS at 09:53

## 2025-01-08 RX ADMIN — SUGAMMADEX 200 MG: 100 INJECTION, SOLUTION INTRAVENOUS at 10:32

## 2025-01-08 RX ADMIN — DEXMEDETOMIDINE HYDROCHLORIDE 8 MCG: 100 INJECTION, SOLUTION INTRAVENOUS at 09:50

## 2025-01-08 RX ADMIN — DEXAMETHASONE SODIUM PHOSPHATE 10 MG: 10 INJECTION, SOLUTION INTRAMUSCULAR; INTRAVENOUS at 09:54

## 2025-01-08 RX ADMIN — ONDANSETRON 4 MG: 2 INJECTION INTRAMUSCULAR; INTRAVENOUS at 09:54

## 2025-01-08 RX ADMIN — DEXMEDETOMIDINE HYDROCHLORIDE 8 MCG: 100 INJECTION, SOLUTION INTRAVENOUS at 10:35

## 2025-01-08 RX ADMIN — PROPOFOL 200 MG: 10 INJECTION, EMULSION INTRAVENOUS at 09:54

## 2025-01-08 RX ADMIN — ROCURONIUM BROMIDE 50 MG: 10 INJECTION, SOLUTION INTRAVENOUS at 09:54

## 2025-01-08 RX ADMIN — FENTANYL CITRATE 50 MCG: 50 INJECTION INTRAMUSCULAR; INTRAVENOUS at 10:56

## 2025-01-08 NOTE — BRIEF OP NOTE (RAD/CATH)
OPERATIVE REPORT  PATIENT NAME: Gabriel Juarez    :  1980  MRN: [id number]  Pt Location: [facility] OR ROOM 05    SURGERY DATE: 2025    Surgeons and Role:     * Yuval Pollard MD - Primary     * Edna Alejo MD - Assisting     * Rogerio Eaton MD - Observing    Preop Diagnosis:  Chronic tonsillitis [J35.01]    Post-Op Diagnosis Codes:     * Chronic tonsillitis [J35.01]    Procedure(s):  TONSILLECTOMY    Specimen(s):  ID Type Source Tests Collected by Time Destination   1 : portion right tonsil Tissue Tonsil TISSUE EXAM Yuval Pollard MD 2025 1009    2 : portion left tonsil Tissue Tonsil TISSUE EXAM Yuval Pollard MD 2025 1010        Estimated Blood Loss:   Minimal    Drains:  * No LDAs found *    Anesthesia Type:   General    Operative Indications:  Chronic tonsillitis [J35.01]    Operative Findings:  2+ tonsils b/l      Complications:   None    SIGNATURE: Edna Alejo MD  DATE: 2025  TIME: 10:57 AM

## 2025-01-08 NOTE — INTERVAL H&P NOTE
H&P reviewed. After examining the patient I find no changes in the patients condition since the H&P had been written.    Vitals:    01/08/25 0811   BP: 133/90   Pulse: 92   Resp: 18   Temp: 98.5 °F (36.9 °C)   SpO2: 97%   For OR today    ROS otherwise unremarkable    Heart:clear  Lungs:clear  Abd: soft  Extremities wnl

## 2025-01-08 NOTE — DISCHARGE INSTR - AVS FIRST PAGE
Tonsillectomy Postoperative Instructions    What to expect:  -Pink or blood streaked saliva during the first 24 hours  -Patient may refuse to eat or drink anything by mouth.  Limited food intake is acceptable in the first 2-3 days as long as he or she is drinking plenty of fluids (urine remains light yellow or clear).  Offer sips of liquid (water, juice, Gatorade, Pedialyte) every hour.  -Bad breath  -White/Yellow/Gray coating in the back of the throat  -Pain with swallowing/talking  -Ear pain    What to Avoid x 2 weeks:  -Do Not eat foods with sharp edges or crunchy coatings for 2 weeks following surgery; Stick with soft/mushy foods (pasta, mashed potatoes, baked chicken, cooked vegetables, pudding, etc.)  -Do Not drink fluids with red dye since it can look like blood  -Do Not eat or drink anything that is hot or acidic (orange juice, soda, etc.)  -Do Not gargle  -Do Not strain or lift anything heavy  -Do Not take aspirin or blood thinners until instructed to do so by your doctor    When to call the doctor or go to Emergency Room:  -Bright red blood coming from the mouth or nose  -Coughing up dark blood or blood clots  -Shortness of breath  -Persistent nausea/vomiting  -Temperature above 101 F  -Feeling faint or dizzy  -Decreased urine output compared to before surgery     Follow up with your doctor in 2-3 weeks, or as instructed.  -[facility]:  [phone]  -[facility]:  [phone]  -[facility]:  [phone]  -[facility]:  [phone]     Medications    Use Acetaminophen (Tylenol) every 6 hours.   Use ONLY as needed for breakthrough pain (patients >6 years old):    Oxycodone liquid as discussed with your surgeon    NOTE: Do not exceed more than 2 grams of Acetaminophen in 24 hours if under 12 years old, or 3-4 grams of Acetaminophen in 24 hours if over 12 years old.  Do not take more than 1 medication containing acetaminophen (Tylenol) at the same time.

## 2025-01-08 NOTE — ANESTHESIA PREPROCEDURE EVALUATION
Procedure:  TONSILLECTOMY (Throat)    Relevant Problems   ANESTHESIA (within normal limits)      CARDIO (within normal limits)      ENDO (within normal limits)      GI/HEPATIC (within normal limits)      /RENAL (within normal limits)      GYN (within normal limits)      HEMATOLOGY (within normal limits)      MUSCULOSKELETAL (within normal limits)      NEURO/PSYCH   (+) Current mild episode of major depressive disorder (HCC)      PULMONARY (within normal limits)      Behavioral Health   (+) Alcohol abuse   (+) Attention deficit hyperactivity disorder (ADHD), combined type   Patient reported PVCs when HR goes below 60s.       Physical Exam    Airway    Mallampati score: I  TM Distance: >3 FB  Neck ROM: full     Dental   No notable dental hx     Cardiovascular  Rhythm: regular, Rate: normal, Cardiovascular exam normal    Pulmonary  Pulmonary exam normal Breath sounds clear to auscultation    Other Findings  White plaques on tongue      Anesthesia Plan  ASA Score- 2     Anesthesia Type- general with ASA Monitors.         Additional Monitors:     Airway Plan: ETT.           Plan Factors-Exercise tolerance (METS): >4 METS.    Chart reviewed.  Imaging results reviewed. Existing labs reviewed. Patient summary reviewed.                  Induction- intravenous.    Postoperative Plan- Plan for postoperative opioid use. Planned trial extubation        Informed Consent- Anesthetic plan and risks discussed with patient.  I personally reviewed this patient with the CRNA. Discussed and agreed on the Anesthesia Plan with the CRNA..

## 2025-01-08 NOTE — ANESTHESIA POSTPROCEDURE EVALUATION
Post-Op Assessment Note    CV Status:  Stable    Pain management: adequate       Mental Status:  Alert and awake   Hydration Status:  Euvolemic   PONV Controlled:  Controlled   Airway Patency:  Patent     Post Op Vitals Reviewed: Yes    No anethesia notable event occurred.    Staff: Anesthesiologist, CRNA           Last Filed PACU Vitals:  Vitals Value Taken Time   Temp 97.5 °F (36.4 °C) 01/08/25 1043   Pulse 85 01/08/25 1112   /64 01/08/25 1100   Resp 17 01/08/25 1110   SpO2 97 % 01/08/25 1112   Vitals shown include unfiled device data.    Modified Marisela:     Vitals Value Taken Time   Activity 2 01/08/25 1100   Respiration 2 01/08/25 1100   Circulation 2 01/08/25 1100   Consciousness 1 01/08/25 1100   Oxygen Saturation 2 01/08/25 1100     Modified Marisela Score: 9

## 2025-01-10 PROCEDURE — 88304 TISSUE EXAM BY PATHOLOGIST: CPT | Performed by: PATHOLOGY

## 2025-01-10 NOTE — OP NOTE
OPERATIVE REPORT  PATIENT NAME: Gabriel Juarez    :  1980  MRN: [id number]  Pt Location: [facility] OR ROOM 05    SURGERY DATE: 2025    Surgeons and Role:     * Yuval Pollard MD - Primary     * Edna Alejo MD - Assisting     * Rogerio Eaton MD - Observing    Preop Diagnosis:  Chronic tonsillitis [J35.01]    Post-Op Diagnosis Codes:     * Chronic tonsillitis [J35.01]    Procedure(s):  TONSILLECTOMY    Specimen(s):  ID Type Source Tests Collected by Time Destination   1 : portion of right tonsil Tissue Tonsil TISSUE EXAM Yuval Pollard MD 2025 1009    2 : portion of left tonsil Tissue Tonsil TISSUE EXAM Yuval Pollard MD 2025 1010        Estimated Blood Loss:   Minimal    Drains:  * No LDAs found *    Anesthesia Type:   General    Operative Indications:  Chronic tonsillitis [J35.01]      Operative Findings:  Large tonsils bilaterally      Complications:   None    Procedure and Technique:      The patient was identified in the holding and brought to the operating room, anesthesia induced by Anesthesia team.  The patient was positioned and prepped and draped in usual fashion.  A time-out was performed per the usual hospital protocol and the procedure was begun as follows.    Patient was suspended with the Sanjana Kota mouthgag.  Large tonsils were noted.  Using Bovie electrocautery the right tonsil and the left tonsil were then removed and sent for permanent section.    Hemostasis was achieved with Bovie electrocautery and suction Bovie.  Stomach was suctioned and the patient was turned back over to anesthesia for further care.  Excellent hemostasis was achieved.   I was present for the entire procedure.    Patient Disposition:  PACU              SIGNATURE: Yuval Pollard MD  DATE: January 10, 2025  TIME: 12:16 PM

## 2025-01-16 DIAGNOSIS — F90.2 ATTENTION DEFICIT HYPERACTIVITY DISORDER (ADHD), COMBINED TYPE: ICD-10-CM

## 2025-01-16 DIAGNOSIS — F32.0 CURRENT MILD EPISODE OF MAJOR DEPRESSIVE DISORDER WITHOUT PRIOR EPISODE (HCC): ICD-10-CM

## 2025-01-16 DIAGNOSIS — F51.01 PRIMARY INSOMNIA: ICD-10-CM

## 2025-01-16 RX ORDER — HYDROXYZINE PAMOATE 100 MG
100 CAPSULE ORAL 3 TIMES DAILY PRN
Qty: 90 CAPSULE | Refills: 1 | Status: SHIPPED | OUTPATIENT
Start: 2025-01-16

## 2025-01-16 RX ORDER — BUPROPION HYDROCHLORIDE 150 MG/1
150 TABLET ORAL EVERY MORNING
Qty: 90 TABLET | Refills: 1 | Status: SHIPPED | OUTPATIENT
Start: 2025-01-16 | End: 2026-01-11

## 2025-01-17 ENCOUNTER — OFFICE VISIT (OUTPATIENT)
Dept: PHYSICAL THERAPY | Facility: CLINIC | Age: 45
End: 2025-01-17
Payer: COMMERCIAL

## 2025-01-17 DIAGNOSIS — R26.89 BALANCE DISORDER: ICD-10-CM

## 2025-01-17 DIAGNOSIS — M62.81 MUSCLE WEAKNESS (GENERALIZED): Primary | ICD-10-CM

## 2025-01-17 DIAGNOSIS — R26.2 DIFFICULTY WALKING: ICD-10-CM

## 2025-01-17 PROCEDURE — 97110 THERAPEUTIC EXERCISES: CPT | Performed by: PHYSICAL THERAPIST

## 2025-01-17 PROCEDURE — 97162 PT EVAL MOD COMPLEX 30 MIN: CPT | Performed by: PHYSICAL THERAPIST

## 2025-01-17 RX ORDER — LORAZEPAM 1 MG/1
1 TABLET ORAL EVERY 8 HOURS PRN
Qty: 60 TABLET | Refills: 5 | Status: SHIPPED | OUTPATIENT
Start: 2025-01-17

## 2025-01-17 RX ORDER — DEXTROAMPHETAMINE SACCHARATE, AMPHETAMINE ASPARTATE, DEXTROAMPHETAMINE SULFATE AND AMPHETAMINE SULFATE 2.5; 2.5; 2.5; 2.5 MG/1; MG/1; MG/1; MG/1
10 TABLET ORAL
Qty: 60 TABLET | Refills: 0 | Status: SHIPPED | OUTPATIENT
Start: 2025-01-17

## 2025-01-17 NOTE — PROGRESS NOTES
PT Evaluation     Today's date: 2025  Patient name: Gabriel Juarez  : 1980  MRN: [id number]  Referring provider: Gifty Tripp PT  Dx:   Encounter Diagnosis     ICD-10-CM    1. Muscle weakness (generalized)  M62.81       2. Balance disorder  R26.89       3. Difficulty walking  R26.2                   Assessment  Impairments: abnormal coordination, abnormal gait, abnormal muscle firing, abnormal muscle tone, abnormal or restricted ROM, abnormal movement, activity intolerance, impaired balance, impaired physical strength, lacks appropriate home exercise program, pain with function, scapular dyskinesis, weight-bearing intolerance, poor posture , poor body mechanics and endurance  Symptom irritability: moderate    Assessment details: Gabriel Juarez is a 45 y.o. male presenting to outpatient physical therapy at [facility] with complaints of deconditioning and imbalance from continual infections. He is post op tonsillectomy on 2025.  He presents with decreased range of motion, decreased strength, limited flexibility, poor postural awareness, poor body mechanics, altered gait pattern, poor balance, decreased tolerance to activity and decreased functional mobility due to Muscle weakness (generalized)  (primary encounter diagnosis), Balance disorder, and Gait abnormality.  He would benefit from skilled PT services in order to address these deficits and reach maximum level of function.  Thank you for the referral!   Understanding of Dx/Px/POC: good     Prognosis: good    Goals  STGs (in 4 weeks):  1. Pt will report having at least a 50% improvement since I.E.   2. Pt will be able to perform at least 15 STS in 30 seconds.   3. Pt will be able to perform 5 STS in 10 seconds or less than.     LTGs (in 12 weeks):  1. Pt will report having at least a 75% improvement since I.E.   2. Pt will amb with symmetrical gait pattern over even and uneven terrain without AD.   3. Pt will ascend/descend steps and curbs  with reciprocal pattern with good eccentric quad control.   4. Pt will be able bend, squat, and kneel with good form.   5. Pt will be able to lift for ADLs and work duties with good form and balance.     Plan  Patient would benefit from: PT eval and skilled physical therapy  Planned modality interventions: TENS and unattended electrical stimulation    Planned therapy interventions: IASTM, joint mobilization, manual therapy, neuromuscular re-education, patient/caregiver education, self care, strengthening, stretching, therapeutic activities, therapeutic exercise, graded motor, flexibility and balance    Frequency: 2x week  Plan of Care beginning date: 2025  Plan of Care expiration date: 2025  Treatment plan discussed with: patient    Subjective Evaluation    History of Present Illness  Mechanism of injury: Pt is a 45 year old male who presents with deconditioning when he was diagnosed with mono and streph in 2024 and became septic. He reports that he went to the hospital and then discharged to home where he would sleep a lot. He ended up back in the hospital due to electrolyte imbalances and deconditioning. He was in the hospital for one week and then discharged home 24. He went through OP PT past summer and did well with regaining strength and coordination. He then returned back home to [place]. Over the past few months, he had constant infections and due to abscess he had a tonsillectomy on 2025.  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   Quality of life: good    Patient Goals  Patient goals for therapy: decreased pain, improved balance, increased motion, increased strength, independence with ADLs/IADLs and return to sport/leisure activities    Pain  Current pain ratin  At best pain ratin  At worst pain ratin  Pain location: no pain   Quality: NA  Relieving factors: relaxation and rest  Aggravating factors:  standing and walking  Progression: improved    Hand dominance: right    Treatments  Discharged from (in last 30 days): inpatient hospitalization    Objective    Posture: Lumbar lordosis is decreased in standing. There is no lateral shift.        Strength:  Core strength: Upper abs: 3+/5; lower abs: 3-/5     Right  Left  Hip flexion:  4-/5  3+/5  Knee ext  4-/5  3+/5  Ankle DF  4-/5  4-/5  Great toe ext  4/5  4/5  Ankle PF  3-/5  3-/5  Knee flex  4-/5  4-/5    Ankle Inversion 3+/5  3+/5  Ankle Eversion 4-/5  3+/5      Hip abduction  3-/5  3-/5  Hip adduction  3+/5  3+/5  Hip extension  3-/5  3-/5    Shoulder Flex  NT  NT  Shoulder ABD  NT  NT  S' ER   NT  NT  S' IR   NT  NT      Sensation: intact throughout BLE with light touch     Function:   Pt ambs with ataxic gait pattern with slight LLE circumduction.     30 second sit<>stand: 12 times (no UEs)   5 times: 9 seconds (no UEs)  DGI: TBA  6 MWT: TBA    Heel to shin test: positive  Rapid foot taps: positive     Babinski: TBA  Clonus: (R) negative (L) negative     Precautions: fall risk; gait belt    HEP: SLR, Sidelying H' ABD, mod side planks, Quadruped LE lifts,       Daily Treatment Diary     FOTO Completed On: NA    POC Expires Reeval for Medicare to be completed  Unit Limit Auth Expiration Date PT/OT/STVisit Limit   2/14/2025 By visit MICHELLE MARTINEZ 12/31/2025 NA    Completed on visit: MICHELLE                   Auth Status DATE 1/17        NA Visit # 1         Remaining NA        MANUAL THERAPY                                                               THERAPEUTIC EXERCISE HEP         LAQ c towel roll                    SLR (flex) 0# 5 sec x 10 ea 0# 5 sec x 10        SLR (ER)                    Sidelying H' ABD 0# 5 sec x 10 ea 0# 5 sec x 10 ea                            Seated HS stretch          2 way piriformis stretch                    Static Lunges          3 way sliders                               HR off step          Standing TB B H' ABD                     NEUROMUSCULAR REEDUCATION           TB Scap Retraction on Airex          TB B S' Ext on Airex                    TB Pallof Press          TB Pallof Press with rotation                    Airex Tandem          Airex SLS                    1 alt cone taps          2 alt cone taps          Cone taps in arch                    Two feet hops in place                     TB Clamshells          TB Bridges                    Quadruped Alt LE 0# 10 ea 0# 10 ea        Quadruped Bird-Dog  NV                  Prone Plank                    Mod Side Plank 30 sec x 3 ea 30 sec x 1 ea        Mod Side Plank with clamshells                              Touchdown (Lat)          Touchdown to H' ABD                    TB Lateral Band Walks                    THERAPEUTIC ACTIVITY          Elliptical/RB                    Step up and eccentric lower Combo                              GAIT TRAINING                                                  MODALITIES

## 2025-01-21 ENCOUNTER — OFFICE VISIT (OUTPATIENT)
Dept: PHYSICAL THERAPY | Facility: CLINIC | Age: 45
End: 2025-01-21
Payer: COMMERCIAL

## 2025-01-21 DIAGNOSIS — M62.81 MUSCLE WEAKNESS (GENERALIZED): Primary | ICD-10-CM

## 2025-01-21 DIAGNOSIS — R26.89 BALANCE DISORDER: ICD-10-CM

## 2025-01-21 DIAGNOSIS — R26.2 DIFFICULTY WALKING: ICD-10-CM

## 2025-01-21 PROCEDURE — 97110 THERAPEUTIC EXERCISES: CPT | Performed by: PHYSICAL THERAPIST

## 2025-01-21 PROCEDURE — 97530 THERAPEUTIC ACTIVITIES: CPT | Performed by: PHYSICAL THERAPIST

## 2025-01-21 PROCEDURE — 97112 NEUROMUSCULAR REEDUCATION: CPT | Performed by: PHYSICAL THERAPIST

## 2025-01-21 NOTE — PROGRESS NOTES
Daily Note     Today's date: 2025  Patient name: Gabriel Juarez  : 1980  MRN: [id number]  Referring provider: No ref. provider found  Dx:   Encounter Diagnosis     ICD-10-CM    1. Muscle weakness (generalized)  M62.81       2. Balance disorder  R26.89       3. Difficulty walking  R26.2                      Subjective: He reports that he feels okay today.       Objective: See treatment diary below      Assessment: Tolerated treatment well; initiated POC RB for BLE strengthening exercises. Vcs provided on proper sequencing with exercises; added SLR c ER, sidelying clamshells, TB bridges, and wall sits. He was fatigued post session but denies having pain. Patient demonstrated fatigue post treatment and would benefit from continued PT      Plan: Continue per plan of care.        Precautions: fall risk; gait belt    HEP: SLR, Sidelying H' ABD, mod side planks, Quadruped LE lifts,       Daily Treatment Diary     FOTO Completed On: NA    POC Expires Reeval for Medicare to be completed  Unit Limit Auth Expiration Date PT/OT/STVisit Limit   2025 By visit NA NA 2025 NA    Completed on visit: NA                   Auth Status DATE        NA Visit # 1 2        Remaining NA NA       MANUAL THERAPY                                                               THERAPEUTIC EXERCISE HEP         LAQ c towel roll                    SLR (flex) 0# 5 sec x 10 ea 0# 5 sec x 10 0# 5 sec x 15 ea       SLR (ER)   0# 5 sec x 15 ea                 Sidelying H' ABD 0# 5 sec x 10 ea 0# 5 sec x 10 ea 0# 5 sec; 1x15 ea                           Seated HS stretch          2 way piriformis stretch                    Static Lunges          3 way sliders                               HR off step   Half foam: 30       Standing TB B H' ABD                    NEUROMUSCULAR REEDUCATION           Wall Sit   10 sec x 5       TB Scap Retraction on Airex          TB B S' Ext on Airex                    TB Pallof Press         "  TB Pallof Press with rotation                    Airex Tandem          Airex SLS   30 sec x 3 ea                 1 alt cone taps          2 alt cone taps          Cone taps in arch                    Two feet hops in place                     TB Clamshells   BTB 5 sec x 20       TB Bridges   BTB 5 sec x 20       Dying Bug (same side extension)   2x4 ea                 Quadruped Alt LE 0# 10 ea 0# 10 ea        Quadruped Bird-Dog  NV                  Prone Plank                    Mod Side Plank 30 sec x 3 ea 30 sec x 1 ea        Mod Side Plank with clamshells                              Touchdown (Lat)          Touchdown to H' ABD                    TB Lateral Band Walks                    THERAPEUTIC ACTIVITY          Elliptical/RB   10 mins Lvl 1 above 80 mph                 Step up and eccentric lower Combo   (6\") 10 ea                           GAIT TRAINING                                                  MODALITIES                                          "

## 2025-01-29 ENCOUNTER — APPOINTMENT (OUTPATIENT)
Dept: PHYSICAL THERAPY | Facility: CLINIC | Age: 45
End: 2025-01-29
Payer: COMMERCIAL

## 2025-01-31 ENCOUNTER — APPOINTMENT (OUTPATIENT)
Dept: PHYSICAL THERAPY | Facility: CLINIC | Age: 45
End: 2025-01-31
Payer: COMMERCIAL

## 2025-02-05 ENCOUNTER — APPOINTMENT (OUTPATIENT)
Dept: PHYSICAL THERAPY | Facility: CLINIC | Age: 45
End: 2025-02-05
Payer: COMMERCIAL

## 2025-02-07 ENCOUNTER — OFFICE VISIT (OUTPATIENT)
Dept: PHYSICAL THERAPY | Facility: CLINIC | Age: 45
End: 2025-02-07
Payer: COMMERCIAL

## 2025-02-07 DIAGNOSIS — R26.89 BALANCE DISORDER: ICD-10-CM

## 2025-02-07 DIAGNOSIS — M62.81 MUSCLE WEAKNESS (GENERALIZED): Primary | ICD-10-CM

## 2025-02-07 DIAGNOSIS — R26.2 DIFFICULTY WALKING: ICD-10-CM

## 2025-02-07 PROCEDURE — 97530 THERAPEUTIC ACTIVITIES: CPT | Performed by: PHYSICAL THERAPIST

## 2025-02-07 PROCEDURE — 97112 NEUROMUSCULAR REEDUCATION: CPT | Performed by: PHYSICAL THERAPIST

## 2025-02-07 NOTE — PROGRESS NOTES
Daily Note     Today's date: 2025  Patient name: Gabriel Juarez  : 1980  MRN: [id number]  Referring provider: Gifty Tripp, PT  Dx:   Encounter Diagnosis     ICD-10-CM    1. Muscle weakness (generalized)  M62.81       2. Balance disorder  R26.89       3. Difficulty walking  R26.2                      Subjective: he reports that he has been very busy      Objective: See treatment diary below      Assessment: Tolerated treatment well; added chair squats to OH press. Vcs provided on proper sequencing with exercises; added mod side plank with clamshell and chop and lift in half kneel. Concluded with static stretching. He reports fatigue and challenge but denies having pain. Patient demonstrated fatigue post treatment and would benefit from continued PT      Plan: Continue per plan of care.      Precautions: fall risk; gait belt    HEP: SLR, Sidelying H' ABD, mod side planks, Quadruped LE lifts,       Daily Treatment Diary     FOTO Completed On: NA    POC Expires Reeval for Medicare to be completed  Unit Limit Auth Expiration Date PT/OT/STVisit Limit   2025 By visit NA NA 2025 NA    Completed on visit: NA                   Auth Status DATE       NA Visit # 1 2 3       Remaining NA NA NA      MANUAL THERAPY                                                               THERAPEUTIC EXERCISE HEP         LAQ c towel roll                    SLR (flex) 0# 5 sec x 10 ea 0# 5 sec x 10 0# 5 sec x 15 ea       SLR (ER)   0# 5 sec x 15 ea                 Sidelying H' ABD 0# 5 sec x 10 ea 0# 5 sec x 10 ea 0# 5 sec; 1x15 ea OTB 5 sec; 2x10 ea                          Seated HS stretch          2 way piriformis stretch    Supine fig 4: 20 sec x 3 ea                Static Lunges          3 way sliders                               HR off step   Half foam: 30 Half foam: 30      Standing TB B H' ABD                    NEUROMUSCULAR REEDUCATION           Wall Sit   10 sec x 5 10 sec x 5       TB Scap  "Retraction on Airex          TB B S' Ext on Airex                    TB Pallof Press          TB Pallof Press with rotation                    Airex Tandem          Airex SLS   30 sec x 3 ea 30 sec x 3 ea                1 alt cone taps          2 alt cone taps          Cone taps in arch                    Two feet hops in place                     TB Clamshells   BTB 5 sec x 20       TB Bridges   BTB 5 sec x 20       Dying Bug (same side extension)   2x4 ea                 Quadruped Alt LE 0# 10 ea 0# 10 ea        Quadruped Bird-Dog  NV  NV?                Prone Plank                    Mod Side Plank 30 sec x 3 ea 30 sec x 1 ea        Mod Side Plank with clamshells                    Half kneel chop and lift    5# 2x10 ea                Touchdown (Lat)          Touchdown to H' ABD                    TB Lateral Band Walks                    Prone quad stretch with SOS    30 sec x 3 ea      Supine HS stretch c SOS    30 sec x 3 ea      THERAPEUTIC ACTIVITY          Elliptical/RB   10 mins Lvl 1 above 80 mph 14 mins Lvl 1 above 80 mph                Step up and eccentric lower Combo   (6\") 10 ea (6\") 10 ea      Chair squat to OH press bilaterally    3# 2x10                GAIT TRAINING                                                  MODALITIES                                            "

## 2025-02-10 ENCOUNTER — APPOINTMENT (OUTPATIENT)
Dept: PHYSICAL THERAPY | Facility: CLINIC | Age: 45
End: 2025-02-10
Payer: COMMERCIAL

## 2025-02-12 ENCOUNTER — APPOINTMENT (OUTPATIENT)
Dept: PHYSICAL THERAPY | Facility: CLINIC | Age: 45
End: 2025-02-12
Payer: COMMERCIAL

## 2025-03-27 ENCOUNTER — OFFICE VISIT (OUTPATIENT)
Dept: PHYSICAL THERAPY | Facility: CLINIC | Age: 45
End: 2025-03-27
Payer: COMMERCIAL

## 2025-03-27 DIAGNOSIS — R53.1 WEAKNESS: Primary | ICD-10-CM

## 2025-03-27 PROCEDURE — 97110 THERAPEUTIC EXERCISES: CPT

## 2025-03-27 PROCEDURE — 97161 PT EVAL LOW COMPLEX 20 MIN: CPT

## 2025-03-27 NOTE — PROGRESS NOTES
PT Evaluation     Today's date: 3/27/2025  Patient name: Gabriel Juarez  : 1980  MRN: [id number]  Referring provider: Gifty Tripp, PT  Dx:   Encounter Diagnosis     ICD-10-CM    1. Weakness  R53.1                      Assessment  Impairments: abnormal gait, activity intolerance, impaired balance, impaired physical strength and lacks appropriate home exercise program    Assessment details: Gabriel Juarez is a pleasant 45 y.o. male who presents with general weakness secondary to recent time in the hospital. He has a decreased 5x sit to stand time compared to age related normative values indicating decreased BLE strength. He also has a decreased 6 minute walk test. Though there are not normaltive values for his age, someone in their 60s is expected to walk 572 meters during the test whereas he walked 432 meters. So, based on this I would expect him to be able to walk greater than 572 meters in 6 minutes based on his age. He also has decreased BLE strength with resistive testing. These impairments are limiting him with prolonged walking/standing, mountain biking, and working long shifts as a nurse. These signs and symptoms are consistent with general weakness. He will benefit from skilled PT to address strength and endurance to return to PLOF        Prognosis details: Positive prognostic indicators include positive attitude toward recovery, motivated to improve, high self-efficacy, good understanding of condition, realistic expectations.  Negative prognostic indicators include chronicity of symptoms    Goals  STG to be achieved in 4 weeks  Patient will have improved 5 time sit to stand to less than 10 seconds  Improved 6 minute walk test to greater than 600 meters  Patient will have improved gross BLE strength to 5/5  Patient will be independent with HEP.    LTG to be achieved in 8 weeks  Patient will be able to work 12 hour shifts multiple days a week without difficulty  Patient will be able to resume  mountain biking  Improved 5x sit to stand to 8 seconds or less      Plan  Patient would benefit from: skilled physical therapy  Planned modality interventions: cryotherapy and thermotherapy: hydrocollator packs    Planned therapy interventions: balance, home exercise program, gait training, functional ROM exercises, body mechanics training, joint mobilization, manual therapy, neuromuscular re-education, therapeutic exercise, therapeutic activities, stretching, strengthening, flexibility and patient/caregiver education    Frequency: 2x week  Duration in weeks: 8  Plan of Care beginning date: 3/27/2025  Plan of Care expiration date: 5/22/2025  Treatment plan discussed with: patient and PTA        Subjective Evaluation    History of Present Illness  Mechanism of injury: Patient notes he had been sick for a little and was in the hospital for a period of time so he is now just weaker and feels his coordination and balance aren't great. He had been at [place] for PT in the past but this location is closer and had an appointment sooner. Denies any pain, numbness or tingling. 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  Activities: motorcycle, mountain biking  Patient Goals  Patient goal: Patient would like to improve strength, coordination, overall conditioning  Pain  No pain reported          Objective     Strength/Myotome Testing     Left Hip   Planes of Motion   Flexion: 4  Extension: 4  Abduction: 4    Right Hip   Planes of Motion   Flexion: 4  Extension: 4  Abduction: 4-    Left Knee   Flexion: 4  Extension: 4    Right Knee   Flexion: 4-  Extension: 4    Left Ankle/Foot   Dorsiflexion: 5    Right Ankle/Foot   Dorsiflexion: 5    Ambulation     Observational Gait   Decreased walking speed and stride length.     Additional Observational Gait Details  Observed increased pronation bilaterally during gait     Functional  Assessment        Comments  5x sit to stand on 3/27: 12.3 seconds  6 min walk test: 1,420 feet (432 meters)                Daily Treatment Diary     Precautions: none  Functional Limitations: prolonged walking/standing, mountain biking  Impairments: 5x sit to stand, 6 min walk test, BLE strength   [facility] Code: [email or web address]   POC expiration: 5/22/25  FOTO intake: n/a  FOTO status: n/a  FOTO predicted by visit n/a: n/a      POC Expires Reeval for Medicare to be completed  Unit Limit Auth Expiration Date PT/OT/STVisit Limit   5/22/25 By visit n/a N/a N/a BOMN                       Auth Status DATE 3/27        NA Visit # 1         Remaining         MANUAL THERAPY                                                               THERAPEUTIC EXERCISE HEP         bike  nv        TM  nv        Bridge pball  nv        Prone hip ext          Sidelying hip abd          squats  nv        Fwd lunge  nv        Lat lunge  nv                  Single leg eccentric chair squat  nv                            Leg press  nv        Seated leg curl machine  nv        Seated leg ext machine  nv                            NEUROMUSCULAR REEDUCATION           5 way clock balance  nv        SLS foam  nv        TB monster walk          TB sidestep                                                  THERAPEUTIC ACTIVITY                                                  GAIT TRAINING                                                  MODALITIES

## 2025-03-28 ENCOUNTER — OFFICE VISIT (OUTPATIENT)
Dept: PHYSICAL THERAPY | Facility: CLINIC | Age: 45
End: 2025-03-28
Payer: COMMERCIAL

## 2025-03-28 DIAGNOSIS — R53.1 WEAKNESS: Primary | ICD-10-CM

## 2025-03-28 PROCEDURE — 97110 THERAPEUTIC EXERCISES: CPT

## 2025-03-28 PROCEDURE — 97112 NEUROMUSCULAR REEDUCATION: CPT

## 2025-03-28 NOTE — PROGRESS NOTES
"Daily Note     Today's date: 3/28/2025  Patient name: Gabriel Juarez  : 1980  MRN: [id number]  Referring provider: Gifty Tripp, PT  Dx:   Encounter Diagnosis     ICD-10-CM    1. Weakness  R53.1                      Subjective: Patient notes feeling good today. Nothing new since IE yesterday      Objective: See treatment diary below      Assessment: Patient tolerated treatment well overall. Session started with upright bike and treadmill for cardio endurance which he reported fatigue after. He is challenged by single leg balance on foam as well as with dynamic movements like with TB 4 way kick. He will benefit from skilled PT to address impairments and return to PLOF      Plan: assess response to last visit, continue endurance and strength training     Daily Treatment Diary     Precautions: none  Functional Limitations: prolonged walking/standing, mountain biking  Impairments: 5x sit to stand, 6 min walk test, BLE strength   [facility] Code: [email or web address]   POC expiration: 25  FOTO intake: n/a  FOTO status: n/a  FOTO predicted by visit n/a: n/a      POC Expires Reeval for Medicare to be completed  Unit Limit Auth Expiration Date PT/OT/STVisit Limit   25 By visit n/a N/a N/a BOMN                       Auth Status DATE 3/27 3/28       NA Visit # 1 2        Remaining         MANUAL THERAPY                                                               THERAPEUTIC EXERCISE HEP         Upright bike  nv 8' 75 RPM       TM  nv 8' 2.3 mph       Bridge pball  nv        bridge   2x10       Prone hip ext          Sidelying hip abd          squats  nv        Fwd lunge  nv        Lat lunge  nv                  Single leg eccentric chair squat  nv                            Leg press  nv 105# 2x10       Seated leg curl machine  nv 45# 2x10       Seated leg ext machine  nv 45# 2x10                           NEUROMUSCULAR REEDUCATION           5 way clock balance  nv        SLS foam  nv Blue disc 20\"x3ea       TB " monster walk          TB sidestep          TB 4 way kick   GTB 10ea                                     THERAPEUTIC ACTIVITY                                                  GAIT TRAINING                                                  MODALITIES

## 2025-04-01 ENCOUNTER — OFFICE VISIT (OUTPATIENT)
Dept: PHYSICAL THERAPY | Facility: CLINIC | Age: 45
End: 2025-04-01
Payer: COMMERCIAL

## 2025-04-01 DIAGNOSIS — R53.1 WEAKNESS: Primary | ICD-10-CM

## 2025-04-01 PROCEDURE — 97112 NEUROMUSCULAR REEDUCATION: CPT

## 2025-04-01 PROCEDURE — 97110 THERAPEUTIC EXERCISES: CPT

## 2025-04-01 NOTE — PROGRESS NOTES
"Daily Note     Today's date: 2025  Patient name: Gabriel Juarez  : 1980  MRN: [id number]  Referring provider: Gifty Tripp, PT  Dx:   Encounter Diagnosis     ICD-10-CM    1. Weakness  R53.1                      Subjective: patient reports feeling good after LV, min soreness.       Objective: See treatment diary below      Assessment: Tolerated treatment well. Endurance deficits noted with all tasks, more so when in SLS with early onset fatigue at glute med. Fair coordination with clock balance, UE support required to complete. Continue to progress dynamic stability and endurance.       Plan: continue working on strength and overall endurance.      Daily Treatment Diary     Precautions: none  Functional Limitations: prolonged walking/standing, mountain biking  Impairments: 5x sit to stand, 6 min walk test, BLE strength   [facility] Code: [email or web address]   POC expiration: 25  FOTO intake: n/a  FOTO status: n/a  FOTO predicted by visit n/a: n/a      POC Expires Reeval for Medicare to be completed  Unit Limit Auth Expiration Date PT/OT/STVisit Limit   25 By visit n/a N/a N/a BOMN                       Auth Status DATE 3/27 3/28 4/1      NA Visit # 1 2 3       Remaining         MANUAL THERAPY                                                               THERAPEUTIC EXERCISE HEP         Upright bike  nv 8' 75 RPM 8' 75 RPM- start      TM  nv 8' 2.3 mph 8' 2.3 mph- end      Bridge pball  nv        bridge   2x10       Prone hip ext          Sidelying hip abd          squats  nv        Fwd lunge  nv  10x      Lat lunge  nv                  Single leg eccentric chair squat  nv                            Leg press  nv 105# 2x10 105# 125# x10 ea      Seated leg curl machine  nv 45# 2x10 45# 2x10      Seated leg ext machine  nv 45# 2x10 45# 2x10                          NEUROMUSCULAR REEDUCATION           5 way clock balance  nv  5 rounds /c slider- dowle HHA      SLS foam  nv Blue disc 20\"x3ea Blue disc " "20\"x3ea      TB monster walk          TB sidestep          TB 4 way kick   GTB 10ea GTB 10ea                                    THERAPEUTIC ACTIVITY                                                  GAIT TRAINING                                                  MODALITIES                                          "

## 2025-04-03 ENCOUNTER — APPOINTMENT (OUTPATIENT)
Dept: PHYSICAL THERAPY | Facility: CLINIC | Age: 45
End: 2025-04-03
Payer: COMMERCIAL

## 2025-04-07 ENCOUNTER — OFFICE VISIT (OUTPATIENT)
Dept: PHYSICAL THERAPY | Facility: CLINIC | Age: 45
End: 2025-04-07
Payer: COMMERCIAL

## 2025-04-07 DIAGNOSIS — R53.1 WEAKNESS: Primary | ICD-10-CM

## 2025-04-07 PROCEDURE — 97110 THERAPEUTIC EXERCISES: CPT

## 2025-04-07 PROCEDURE — 97112 NEUROMUSCULAR REEDUCATION: CPT

## 2025-04-07 NOTE — PROGRESS NOTES
Daily Note     Today's date: 2025  Patient name: Gabriel Juarez  : 1980  MRN: [id number]  Referring provider: Gifty Tripp, PT  Dx:   Encounter Diagnosis     ICD-10-CM    1. Weakness  R53.1                      Subjective: Patient reports he has been walking his dog more - about a mile to mile and a half 4 times a day. Notes each day gets a little easier with this      Objective: See treatment diary below      Assessment: Patient tolerated treatment fair overall. He is showing improved balance with SLS when doing dynamic movements like the clock balance. Able to progress resistance for BLE strengthening exercises and added a KB RDL for posterior chain strengthening. He will benefit from skilled PT to address impairments and return to PLOF      Plan: progress strength and endurance as tolerated     Daily Treatment Diary     Precautions: none  Functional Limitations: prolonged walking/standing, mountain biking  Impairments: 5x sit to stand, 6 min walk test, BLE strength   [facility] [id number]   POC expiration: 25  FOTO intake: n/a  FOTO status: n/a  FOTO predicted by visit n/a: n/a      POC Expires Reeval for Medicare to be completed  Unit Limit Auth Expiration Date PT/OT/STVisit Limit   25 By visit n/a N/a N/a BOMN                       Auth Status DATE 3/27 3/28 4/1 4/7     NA Visit # 1 2 3 4      Remaining         MANUAL THERAPY                                                               THERAPEUTIC EXERCISE HEP         Upright bike  nv 8' 75 RPM 8' 75 RPM- start 8' 75 RPM- start     TM  nv 8' 2.3 mph 8' 2.3 mph- end 8' 2.3 mph- end     Bridge pball  nv        bridge   2x10       Prone hip ext          Sidelying hip abd          squats  nv        Fwd lunge  nv  10x 10ea     Lat lunge  nv                  Single leg eccentric chair squat  nv                            Leg press  nv 105# 2x10 105# 125# x10 ea 135#x10     Seated leg curl machine  nv 45# 2x10 45# 2x10 50# 2x10     Seated  "leg ext machine  nv 45# 2x10 45# 2x10 45#x10  50#x10     KB RDL     15# KB 2x10               NEUROMUSCULAR REEDUCATION           5 way clock balance  nv  5 rounds /c slider- dowle HHA 5 ea w/ slider; bar for HHA as needed     SLS foam  nv Blue disc 20\"x3ea Blue disc 20\"x3ea Blue disc 20\"x3ea     TB monster walk          TB sidestep          TB 4 way kick   GTB 10ea GTB 10ea GTB 10ea                                   THERAPEUTIC ACTIVITY                                                  GAIT TRAINING                                                  MODALITIES                                          "

## 2025-04-10 ENCOUNTER — OFFICE VISIT (OUTPATIENT)
Dept: PHYSICAL THERAPY | Facility: CLINIC | Age: 45
End: 2025-04-10
Payer: COMMERCIAL

## 2025-04-10 DIAGNOSIS — R53.1 WEAKNESS: Primary | ICD-10-CM

## 2025-04-10 PROCEDURE — 97110 THERAPEUTIC EXERCISES: CPT

## 2025-04-10 PROCEDURE — 97112 NEUROMUSCULAR REEDUCATION: CPT

## 2025-04-10 NOTE — PROGRESS NOTES
Daily Note     Today's date: 4/10/2025  Patient name: Gabriel Juarez  : 1980  MRN: [id number]  Referring provider: Gifty Tripp, PT  Dx:   Encounter Diagnosis     ICD-10-CM    1. Weakness  R53.1                      Subjective: Patient reports he has already been feeling much better. He feels his coordination is much improved, his strength is coming back but endurance is still an issue    Objective: See treatment diary below      Assessment: Patient tolerated treatment well. He is showing improved balance on uneven surfaces and needed less frequent rest breaks. He was able to continue increasing resistance with strengthening TE's.  He will benefit from skilled PT to address impairments and return to PLOF      Plan: progress strength and endurance as tolerated     Daily Treatment Diary     Precautions: none  Functional Limitations: prolonged walking/standing, mountain biking  Impairments: 5x sit to stand, 6 min walk test, BLE strength   [facility] Code: [id number]   POC expiration: 25  FOTO intake: n/a  FOTO status: n/a  FOTO predicted by visit n/a: n/a      POC Expires Reeval for Medicare to be completed  Unit Limit Auth Expiration Date PT/OT/STVisit Limit   25 By visit n/a N/a N/a BOMN                       Auth Status DATE 3/27 3/28 4/1 4/7 4/10    NA Visit # 1 2 3 4 5     Remaining         MANUAL THERAPY                                                               THERAPEUTIC EXERCISE HEP         Upright bike  nv 8' 75 RPM 8' 75 RPM- start 8' 75 RPM- start 8' 75 RPM- start    TM  nv 8' 2.3 mph 8' 2.3 mph- end 8' 2.3 mph- end 8' 2.3 mph- end    Bridge pball  nv        bridge   2x10       Prone hip ext          Sidelying hip abd          squats  nv        Fwd lunge  nv  10x 10ea X10 ea    Lat lunge  nv                  Single leg eccentric chair squat  nv                            Leg press  nv 105# 2x10 105# 125# x10 ea 135#x10 135#x10    Seated leg curl machine  nv 45# 2x10 45# 2x10 50# 2x10  "60# 2x10    Seated leg ext machine  nv 45# 2x10 45# 2x10 45#x10  50#x10 60#  10x2    KB RDL     15# KB 2x10 15# KB 2x10              NEUROMUSCULAR REEDUCATION           5 way clock balance  nv  5 rounds /c slider- dowle HHA 5 ea w/ slider; bar for HHA as needed 5 ea w/ slider; bar for HHA as needed    SLS foam  nv Blue disc 20\"x3ea Blue disc 20\"x3ea Blue disc 20\"x3ea Airex  20\"x5 ea    Tandem Stance          TB monster walk      OTB  2 laps    TB sidestep      OTB  2 laps    TB 4 way kick   GTB 10ea GTB 10ea GTB 10ea GTB 10e                                  THERAPEUTIC ACTIVITY                                                  GAIT TRAINING                                                  MODALITIES                                          "

## 2025-04-17 ENCOUNTER — OFFICE VISIT (OUTPATIENT)
Dept: PHYSICAL THERAPY | Facility: CLINIC | Age: 45
End: 2025-04-17
Payer: COMMERCIAL

## 2025-04-17 DIAGNOSIS — F32.0 CURRENT MILD EPISODE OF MAJOR DEPRESSIVE DISORDER WITHOUT PRIOR EPISODE (HCC): ICD-10-CM

## 2025-04-17 DIAGNOSIS — F51.01 PRIMARY INSOMNIA: ICD-10-CM

## 2025-04-17 DIAGNOSIS — F90.2 ATTENTION DEFICIT HYPERACTIVITY DISORDER (ADHD), COMBINED TYPE: ICD-10-CM

## 2025-04-17 DIAGNOSIS — R53.1 WEAKNESS: Primary | ICD-10-CM

## 2025-04-17 PROCEDURE — 97112 NEUROMUSCULAR REEDUCATION: CPT

## 2025-04-17 PROCEDURE — 97110 THERAPEUTIC EXERCISES: CPT

## 2025-04-17 NOTE — PROGRESS NOTES
Daily Note     Today's date: 2025  Patient name: Gabriel Juarez  : 1980  MRN: [id number]  Referring provider: Gifty Tripp, PT  Dx:   Encounter Diagnosis     ICD-10-CM    1. Weakness  R53.1                      Subjective: Patient reports doing well overall. He starts a new work contract on May 12th    Objective: See treatment diary below      Assessment: Patient tolerated treatment well. He is continuing to show improvements with stability when doing single leg dynamic exercises. Discussed plan to add sled pushing/pulling next visit to simulate some work demands. He will benefit from skilled PT to continue addressing impairments and return to PLOF.      Plan: progress strength and endurance as tolerated     Daily Treatment Diary     Precautions: none  Functional Limitations: prolonged walking/standing, mountain biking  Impairments: 5x sit to stand, 6 min walk test, BLE strength   [facility] [id number]   POC expiration: 25  FOTO intake: n/a  FOTO status: n/a  FOTO predicted by visit n/a: n/a      POC Expires Reeval for Medicare to be completed  Unit Limit Auth Expiration Date PT/OT/STVisit Limit   25 By visit n/a N/a N/a BOMN                       Auth Status DATE 3/27 3/28 4/1 4/7 4/10 4/17    NA Visit # 1 2 3 4 5 6     Remaining          MANUAL THERAPY                                                                      THERAPEUTIC EXERCISE HEP          Upright bike  nv 8' 75 RPM 8' 75 RPM- start 8' 75 RPM- start 8' 75 RPM- start 9' 75 RPM- start    TM  nv 8' 2.3 mph 8' 2.3 mph- end 8' 2.3 mph- end 8' 2.3 mph- end 8' 2.3 mph- end    Bridge pball  nv         bridge   2x10        squats  nv         Fwd lunge  nv  10x 10ea X10 ea     Lat lunge  nv         Single leg eccentric chair squat  nv         Leg press  nv 105# 2x10 105# 125# x10 ea 135#x10 135#x10 135#x10  165#x10    Seated leg curl machine  nv 45# 2x10 45# 2x10 50# 2x10 60# 2x10 60# 2x10    Seated leg ext machine  nv 45# 2x10  "45# 2x10 45#x10  50#x10 60#  10x2 60# 2x10    KB RDL     15# KB 2x10 15# KB 2x10 15# KB 2x10               NEUROMUSCULAR REEDUCATION            5 way clock balance  nv  5 rounds /c slider- dowle HHA 5 ea w/ slider; bar for HHA as needed 5 ea w/ slider; bar for HHA as needed 5 ea w/ slider; bar for HHA as needed    SLS foam  nv Blue disc 20\"x3ea Blue disc 20\"x3ea Blue disc 20\"x3ea Airex  20\"x5 ea Airex 20\"x5ea               TB monster walk      OTB  2 laps OTB 2 laps    TB sidestep      OTB  2 laps OTB 2 laps    TB 4 way kick   GTB 10ea GTB 10ea GTB 10ea GTB 10e GTB 10ea                                     THERAPEUTIC ACTIVITY           Sled push/pull       nv                                     GAIT TRAINING                                                       MODALITIES                                             "

## 2025-04-18 RX ORDER — BUPROPION HYDROCHLORIDE 150 MG/1
150 TABLET ORAL EVERY MORNING
Qty: 90 TABLET | Refills: 1 | Status: SHIPPED | OUTPATIENT
Start: 2025-04-18 | End: 2026-04-13

## 2025-04-18 RX ORDER — HYDROXYZINE PAMOATE 100 MG
100 CAPSULE ORAL 3 TIMES DAILY PRN
Qty: 90 CAPSULE | Refills: 1 | Status: SHIPPED | OUTPATIENT
Start: 2025-04-18

## 2025-04-21 ENCOUNTER — OFFICE VISIT (OUTPATIENT)
Dept: PHYSICAL THERAPY | Facility: CLINIC | Age: 45
End: 2025-04-21
Payer: COMMERCIAL

## 2025-04-21 DIAGNOSIS — R53.1 WEAKNESS: Primary | ICD-10-CM

## 2025-04-21 PROCEDURE — 97110 THERAPEUTIC EXERCISES: CPT

## 2025-04-21 PROCEDURE — 97112 NEUROMUSCULAR REEDUCATION: CPT

## 2025-04-21 NOTE — PROGRESS NOTES
Daily Note     Today's date: 2025  Patient name: Gabriel Juarez  : 1980  MRN: [id number]  Referring provider: Gifty Tripp, PT  Dx:   Encounter Diagnosis     ICD-10-CM    1. Weakness  R53.1                      Subjective: Patient reports feeling like he is moving better compared to last week.     Objective: See treatment diary below      Assessment: Patient tolerated treatment well. Difficulty with coordination for pulling the sled backwards - reports feeling it is a combination of strength, balance, and coordination that is contributing to not being able to do it. Did backwards walking with handheld assist from bar. Will do re-evaluation next visit to determine further need for skilled PT      Plan: re-evaluation next visit     Daily Treatment Diary     Precautions: none  Functional Limitations: prolonged walking/standing, mountain biking  Impairments: 5x sit to stand, 6 min walk test, BLE strength   [employer] Code: [id number]   POC expiration: 25  FOTO intake: n/a  FOTO status: n/a  FOTO predicted by visit n/a: n/a      POC Expires Reeval for Medicare to be completed  Unit Limit Auth Expiration Date PT/OT/STVisit Limit   25 By visit n/a N/a N/a BOMN                       Auth Status DATE 4/1 4/7 4/10 4/17 4/21     NA Visit # 3 4 5 6 7      Remaining          MANUAL THERAPY                                                                      THERAPEUTIC EXERCISE HEP          Upright bike  8' 75 RPM- start 8' 75 RPM- start 8' 75 RPM- start 9' 75 RPM- start 9' 75 RPM- start     TM  8' 2.3 mph- end 8' 2.3 mph- end 8' 2.3 mph- end 8' 2.3 mph- end 8' 2.3 mph- end     Bridge pball           bridge           squats           Fwd lunge  10x 10ea X10 ea       Lat lunge           Single leg eccentric chair squat           Leg press  105# 125# x10 ea 135#x10 135#x10 135#x10  165#x10 165#x10  185#x10     Seated leg curl machine  45# 2x10 50# 2x10 60# 2x10 60# 2x10 60# 2x10     Seated leg ext machine   "45# 2x10 45#x10  50#x10 60#  10x2 60# 2x10 60# 2x10     KB RDL   15# KB 2x10 15# KB 2x10 15# KB 2x10 20# KB 2x10                NEUROMUSCULAR REEDUCATION            5 way clock balance  5 rounds /c slider- dowle HHA 5 ea w/ slider; bar for HHA as needed 5 ea w/ slider; bar for HHA as needed 5 ea w/ slider; bar for HHA as needed 5 ea w/ slider; bar for HHA as needed     SLS foam  Blue disc 20\"x3ea Blue disc 20\"x3ea Airex  20\"x5 ea Airex 20\"x5ea Airex 20\"x5ea                TB monster walk    OTB  2 laps OTB 2 laps OTB 2 laps     TB sidestep    OTB  2 laps OTB 2 laps OTB 2 laps     TB 4 way kick  GTB 10ea GTB 10ea GTB 10e GTB 10ea GTB 10ea                                      THERAPEUTIC ACTIVITY           Sled push/pull     nv 60# push only 2 laps     Walking bckward w/ bar for assist      2 laps                           GAIT TRAINING                                                       MODALITIES                                             "

## 2025-04-23 RX ORDER — DEXTROAMPHETAMINE SACCHARATE, AMPHETAMINE ASPARTATE, DEXTROAMPHETAMINE SULFATE AND AMPHETAMINE SULFATE 2.5; 2.5; 2.5; 2.5 MG/1; MG/1; MG/1; MG/1
10 TABLET ORAL
Qty: 60 TABLET | Refills: 0 | Status: SHIPPED | OUTPATIENT
Start: 2025-04-23

## 2025-04-23 RX ORDER — LORAZEPAM 1 MG/1
1 TABLET ORAL EVERY 8 HOURS PRN
Qty: 60 TABLET | Refills: 0 | Status: SHIPPED | OUTPATIENT
Start: 2025-04-23

## 2025-04-24 ENCOUNTER — EVALUATION (OUTPATIENT)
Dept: PHYSICAL THERAPY | Facility: CLINIC | Age: 45
End: 2025-04-24
Payer: COMMERCIAL

## 2025-04-24 DIAGNOSIS — R53.1 WEAKNESS: Primary | ICD-10-CM

## 2025-04-24 PROCEDURE — 97112 NEUROMUSCULAR REEDUCATION: CPT

## 2025-04-24 PROCEDURE — 97110 THERAPEUTIC EXERCISES: CPT

## 2025-04-24 NOTE — PROGRESS NOTES
PT Re-Evaluation     Today's date: 2025  Patient name: Gabriel Juarez  : 1980  MRN: [id number]  Referring provider: Gifty Tripp, CELIO  Dx:   Encounter Diagnosis     ICD-10-CM    1. Weakness  R53.1                      Assessment  Impairments: abnormal gait, activity intolerance, impaired balance, impaired physical strength and lacks appropriate home exercise program    Assessment details: Gabriel Juarez is a pleasant 45 y.o. male who presents with general weakness secondary to recent time in the hospital. He has a decreased 5x sit to stand time compared to age related normative values indicating decreased BLE strength. He also has a decreased 6 minute walk test. Though there are not normaltive values for his age, someone in their 60s is expected to walk 572 meters during the test whereas he walked 432 meters. So, based on this I would expect him to be able to walk greater than 572 meters in 6 minutes based on his age. He also has decreased BLE strength with resistive testing. These impairments are limiting him with prolonged walking/standing, mountain biking, and working long shifts as a nurse. These signs and symptoms are consistent with general weakness. He will benefit from skilled PT to address strength and endurance to return to PLOF    25: Patient has made good progress since beginning skilled PT. He has improved 5x sit to stand time and improved 6 minute walk test though these are still reduced compared to age related normative values. He has improved resistive testing in BLEs and normal DTR's and negative clonus. Continued to observe coordination deficits in BLEs during balance and walking exercises - I did recommend he discuss the coordination issues with his PCP as I feel it is not related to weakness. He is limited with prolonged walking, running, and working long shifts due to continued weakness and decreased endurance. He will benefit from skilled PT to address strength and  endurance to return to PLOF.        Prognosis details: Positive prognostic indicators include positive attitude toward recovery, motivated to improve, high self-efficacy, good understanding of condition, realistic expectations.  Negative prognostic indicators include chronicity of symptoms    Goals  STG to be achieved in 4 weeks  Patient will have improved 5 time sit to stand to less than 10 seconds(almost met)  Improved 6 minute walk test to greater than 600 meters(some progress)  Patient will have improved gross BLE strength to 5/5 (some progress)  Patient will be independent with HEP.(Met)    LTG to be achieved in 8 weeks  Patient will be able to work 12 hour shifts multiple days a week without difficulty(not yet tried)  Patient will be able to resume mountain biking (not assessed)  Improved 5x sit to stand to 8 seconds or less(good progress)      Plan  Patient would benefit from: skilled physical therapy  Planned modality interventions: cryotherapy and thermotherapy: hydrocollator packs    Planned therapy interventions: balance, home exercise program, gait training, functional ROM exercises, body mechanics training, joint mobilization, manual therapy, neuromuscular re-education, therapeutic exercise, therapeutic activities, stretching, strengthening, flexibility and patient/caregiver education    Frequency: 2x week  Duration in weeks: 8  Plan of Care beginning date: 4/24/2025  Plan of Care expiration date: 6/19/2025  Treatment plan discussed with: patient and PTA        Subjective Evaluation    History of Present Illness  Mechanism of injury: Patient notes he had been sick for a little and was in the hospital for a period of time so he is now just weaker and feels his coordination and balance aren't great. He had been at [facility] for PT in the past but this location is closer and had an appointment sooner. Denies any pain, numbness or tingling. 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 He walks his dog 3 times day for about 1.5-2 miles each time. He is going to start a new contract for a nursing job in a few weeks. Not back to running yet  Patient Goals  Patient goal: Patient would like to improve strength, coordination, overall conditioning  Pain  No pain reported          Objective     Neurological Testing     Reflexes   Left   Patellar (L4): normal (2+)  Achilles (S1): normal (2+)  Clonus sign: negative    Right   Patellar (L4): normal (2+)  Achilles (S1): normal (2+)  Clonus sign: negative    Active Range of Motion     Lumbar   Flexion:  WFL  Extension:  WFL    Additional Active Range of Motion Details  Bilateral sideglide WNLs    Strength/Myotome Testing     Left Hip   Planes of Motion   Flexion: 4  Extension: 4  Abduction: 4    Right Hip   Planes of Motion   Flexion: 4  Extension: 4  Abduction: 4-    Left Knee   Flexion: 4  Extension: 4    Right Knee   Flexion: 4-  Extension: 4    Left Ankle/Foot   Dorsiflexion: 5    Right Ankle/Foot   Dorsiflexion: 5    Ambulation     Observational Gait   Decreased walking speed and stride length.     Functional Assessment        Comments  5x sit to stand on 3/27: 12.3 seconds  6 min walk test: 1,420 feet (432 meters)    5x sit to stand on 4/24: 10.7 seconds  6 min walk test on 4/24/25: 1440 feet                 Daily Treatment Diary     Precautions: none  Functional Limitations: prolonged walking/standing, mountain biking  Impairments: 5x sit to stand, 6 min walk test, BLE strength   [facility] Code: [id number]   POC expiration: 6/19/25  FOTO intake: n/a  FOTO status: n/a  FOTO predicted by visit n/a: n/a      POC Expires Reeval for Medicare to be completed  Unit Limit Auth Expiration Date PT/OT/STVisit Limit   6/19/25 By visit n/a N/a N/a BOMN                       Auth Status DATE 4/1  "4/7 4/10 4/17 4/21 4/24    NA Visit # 3 4 5 6 7 8     Remaining          MANUAL THERAPY                                                                      THERAPEUTIC EXERCISE HEP          Upright bike  8' 75 RPM- start 8' 75 RPM- start 8' 75 RPM- start 9' 75 RPM- start 9' 75 RPM- start 8' 75 RPM- start    TM  8' 2.3 mph- end 8' 2.3 mph- end 8' 2.3 mph- end 8' 2.3 mph- end 8' 2.3 mph- end     Bridge pball           bridge           squats           Fwd lunge  10x 10ea X10 ea       Lat lunge           Single leg eccentric chair squat           Leg press  105# 125# x10 ea 135#x10 135#x10 135#x10  165#x10 165#x10  185#x10 185#x10      Seated leg curl machine  45# 2x10 50# 2x10 60# 2x10 60# 2x10 60# 2x10 60# 2x10    Seated leg ext machine  45# 2x10 45#x10  50#x10 60#  10x2 60# 2x10 60# 2x10 60# 2x10    KB RDL   15# KB 2x10 15# KB 2x10 15# KB 2x10 20# KB 2x10 20# KB 20x               NEUROMUSCULAR REEDUCATION            5 way clock balance  5 rounds /c slider- dowle HHA 5 ea w/ slider; bar for HHA as needed 5 ea w/ slider; bar for HHA as needed 5 ea w/ slider; bar for HHA as needed 5 ea w/ slider; bar for HHA as needed 5 ea w/ slider; bar for HHA as needed    SLS foam  Blue disc 20\"x3ea Blue disc 20\"x3ea Airex  20\"x5 ea Airex 20\"x5ea Airex 20\"x5ea Airex 20\"x5 ea               TB monster walk    OTB  2 laps OTB 2 laps OTB 2 laps     TB sidestep    OTB  2 laps OTB 2 laps OTB 2 laps     TB 4 way kick  GTB 10ea GTB 10ea GTB 10e GTB 10ea GTB 10ea                                      THERAPEUTIC ACTIVITY           Sled push/pull     nv 60# push only 2 laps nv    Walking bckward w/ bar for assist      2 laps                           GAIT TRAINING                                                       MODALITIES                                           "

## 2025-04-28 ENCOUNTER — OFFICE VISIT (OUTPATIENT)
Dept: PHYSICAL THERAPY | Facility: CLINIC | Age: 45
End: 2025-04-28
Payer: COMMERCIAL

## 2025-04-28 DIAGNOSIS — R53.1 WEAKNESS: Primary | ICD-10-CM

## 2025-04-28 PROCEDURE — 97112 NEUROMUSCULAR REEDUCATION: CPT

## 2025-04-28 PROCEDURE — 97110 THERAPEUTIC EXERCISES: CPT

## 2025-04-28 NOTE — PROGRESS NOTES
Daily Note     Today's date: 2025  Patient name: Gabriel Juarez  : 1980  MRN: [id number]  Referring provider: Gifty Tripp, PT  Dx:   Encounter Diagnosis     ICD-10-CM    1. Weakness  R53.1                      Subjective: Patient notes he was having a not so great day the last time he was here but the next he had been doing better      Objective: See treatment diary below      Assessment: Patient tolerated treatment well overall. Progressed walking on the treadmill with a slight incline. Progressed resistance for sidestepping/monster walks to progress hip strength/control. Also progressed single leg stance by utilizing a balance disc that is filled with air which is more unstable compared to the airex. He will benefit from skilled PT to address impairments and return to PLOF      Plan: continue working on strength and endurance     Daily Treatment Diary     Precautions: none  Functional Limitations: prolonged walking/standing, mountain biking  Impairments: 5x sit to stand, 6 min walk test, BLE strength   [facility] Code: [email or web address]   POC expiration: 25  FOTO intake: n/a  FOTO status: n/a  FOTO predicted by visit n/a: n/a      POC Expires Reeval for Medicare to be completed  Unit Limit Auth Expiration Date PT/OT/STVisit Limit   25 By visit n/a N/a N/a BOMN                       Auth Status DATE 4/1 4/7 4/10 4/17 4/21 4/24 4/28    NA Visit # 3 4 5 6 7 8 9     Remaining           MANUAL THERAPY                                                                             THERAPEUTIC EXERCISE HEP           Upright bike  8' 75 RPM- start 8' 75 RPM- start 8' 75 RPM- start 9' 75 RPM- start 9' 75 RPM- start 8' 75 RPM- start 8' 75 RPM - start    TM  8' 2.3 mph- end 8' 2.3 mph- end 8' 2.3 mph- end 8' 2.3 mph- end 8' 2.3 mph- end  8' 2.3 mph 3%inc  end    Bridge pball            bridge            squats            Fwd lunge  10x 10ea X10 ea        Lat lunge            Single leg eccentric chair squat      "       Leg press  105# 125# x10 ea 135#x10 135#x10 135#x10  165#x10 165#x10  185#x10 185#x10   185# 2x10    Seated leg curl machine  45# 2x10 50# 2x10 60# 2x10 60# 2x10 60# 2x10 60# 2x10 65# 2x10    Seated leg ext machine  45# 2x10 45#x10  50#x10 60#  10x2 60# 2x10 60# 2x10 60# 2x10 65# 2x10    KB RDL   15# KB 2x10 15# KB 2x10 15# KB 2x10 20# KB 2x10 20# KB 20x 20# KB 20x                NEUROMUSCULAR REEDUCATION             5 way clock balance  5 rounds /c slider- dowle HHA 5 ea w/ slider; bar for HHA as needed 5 ea w/ slider; bar for HHA as needed 5 ea w/ slider; bar for HHA as needed 5 ea w/ slider; bar for HHA as needed 5 ea w/ slider; bar for HHA as needed 5 ea w/ slider; bar for HHA as needed    SLS foam  Blue disc 20\"x3ea Blue disc 20\"x3ea Airex  20\"x5 ea Airex 20\"x5ea Airex 20\"x5ea Airex 20\"x5 ea Black disc 20\"x5                TB monster walk    OTB  2 laps OTB 2 laps OTB 2 laps  GTB 2 laps    TB sidestep    OTB  2 laps OTB 2 laps OTB 2 laps  GTB 2 laps    TB 4 way kick  GTB 10ea GTB 10ea GTB 10e GTB 10ea GTB 10ea                                          THERAPEUTIC ACTIVITY            Sled push/pull     nv 60# push only 2 laps nv nv    Walking bckward w/ bar for assist      2 laps                              GAIT TRAINING                                                            MODALITIES                                             "

## 2025-05-01 ENCOUNTER — APPOINTMENT (OUTPATIENT)
Dept: PHYSICAL THERAPY | Facility: CLINIC | Age: 45
End: 2025-05-01
Payer: COMMERCIAL

## 2025-05-05 ENCOUNTER — OFFICE VISIT (OUTPATIENT)
Dept: PHYSICAL THERAPY | Facility: CLINIC | Age: 45
End: 2025-05-05
Payer: COMMERCIAL

## 2025-05-05 DIAGNOSIS — R53.1 WEAKNESS: Primary | ICD-10-CM

## 2025-05-05 PROCEDURE — 97110 THERAPEUTIC EXERCISES: CPT

## 2025-05-05 PROCEDURE — 97112 NEUROMUSCULAR REEDUCATION: CPT

## 2025-05-05 NOTE — PROGRESS NOTES
Daily Note     Today's date: 2025  Patient name: Gabriel Juarez  : 1980  MRN: [id number]  Referring provider: Gifty Tripp, PT  Dx:   Encounter Diagnosis     ICD-10-CM    1. Weakness  R53.1                      Subjective: Patient reports he continues to see slight improvements in strength and endurance. Coordination slightly better      Objective: See treatment diary below      Assessment: Patient tolerated treatment well. Continues to work hard on strengthening and cardio endurance exercises. Coordination exercises still look rigid.  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     Daily Treatment Diary     Precautions: none  Functional Limitations: prolonged walking/standing, mountain biking  Impairments: 5x sit to stand, 6 min walk test, BLE strength   [facility] Code: [email or web address]   POC expiration: 25  FOTO intake: n/a  FOTO status: n/a  FOTO predicted by visit n/a: n/a      POC Expires Reeval for Medicare to be completed  Unit Limit Auth Expiration Date PT/OT/STVisit Limit   25 By visit n/a N/a N/a BOMN                       Auth Status DATE 4/1 4/7 4/10 4/17 4/21 4/24 4/28 5/5   NA Visit # 3 4 5 6 7 8 9 10    Remaining           MANUAL THERAPY                                                                             THERAPEUTIC EXERCISE HEP           Upright bike  8' 75 RPM- start 8' 75 RPM- start 8' 75 RPM- start 9' 75 RPM- start 9' 75 RPM- start 8' 75 RPM- start 8' 75 RPM - start 8' 75 RPM - start   TM  8' 2.3 mph- end 8' 2.3 mph- end 8' 2.3 mph- end 8' 2.3 mph- end 8' 2.3 mph- end  8' 2.3 mph 3%inc  end 8' 2.3 mph 3%inc  end   Bridge pball            bridge            squats            Fwd lunge  10x 10ea X10 ea        Lat lunge            Single leg eccentric chair squat            Leg press  105# 125# x10 ea 135#x10 135#x10 135#x10  165#x10 165#x10  185#x10 185#x10    "185# 2x10 185# 2x10   Seated leg curl machine  45# 2x10 50# 2x10 60# 2x10 60# 2x10 60# 2x10 60# 2x10 65# 2x10 65# 2x10   Seated leg ext machine  45# 2x10 45#x10  50#x10 60#  10x2 60# 2x10 60# 2x10 60# 2x10 65# 2x10 65# 2x10   KB RDL   15# KB 2x10 15# KB 2x10 15# KB 2x10 20# KB 2x10 20# KB 20x 20# KB 20x 20# KB 20x               NEUROMUSCULAR REEDUCATION             5 way clock balance  5 rounds /c slider- dowle HHA 5 ea w/ slider; bar for HHA as needed 5 ea w/ slider; bar for HHA as needed 5 ea w/ slider; bar for HHA as needed 5 ea w/ slider; bar for HHA as needed 5 ea w/ slider; bar for HHA as needed 5 ea w/ slider; bar for HHA as needed 5 ea w/ slider; bar for HHA as needed   SLS foam  Blue disc 20\"x3ea Blue disc 20\"x3ea Airex  20\"x5 ea Airex 20\"x5ea Airex 20\"x5ea Airex 20\"x5 ea Black disc 20\"x5 Black disc 20\"x5               TB monster walk    OTB  2 laps OTB 2 laps OTB 2 laps  GTB 2 laps GTB 2 laps   TB sidestep    OTB  2 laps OTB 2 laps OTB 2 laps  GTB 2 laps GTB 2 laps   TB 4 way kick  GTB 10ea GTB 10ea GTB 10e GTB 10ea GTB 10ea   GTB x20 ea                                       THERAPEUTIC ACTIVITY            Sled push/pull     nv 60# push only 2 laps nv nv nv   Walking bckward w/ bar for assist      2 laps                              GAIT TRAINING                                                            MODALITIES                                             "

## 2025-05-08 ENCOUNTER — APPOINTMENT (OUTPATIENT)
Dept: PHYSICAL THERAPY | Facility: CLINIC | Age: 45
End: 2025-05-08
Payer: COMMERCIAL

## 2025-06-02 DIAGNOSIS — M25.562 ACUTE PAIN OF LEFT KNEE: Primary | ICD-10-CM

## 2025-06-03 ENCOUNTER — HOSPITAL ENCOUNTER (OUTPATIENT)
Dept: RADIOLOGY | Facility: HOSPITAL | Age: 45
Discharge: HOME | End: 2025-06-03
Payer: COMMERCIAL

## 2025-06-03 DIAGNOSIS — M25.562 ACUTE PAIN OF LEFT KNEE: ICD-10-CM

## 2025-06-03 PROCEDURE — 73564 X-RAY EXAM KNEE 4 OR MORE: CPT

## 2025-06-04 ENCOUNTER — PATIENT MESSAGE (OUTPATIENT)
Dept: OBGYN CLINIC | Facility: CLINIC | Age: 45
End: 2025-06-04

## 2025-06-04 ENCOUNTER — OFFICE VISIT (OUTPATIENT)
Dept: OBGYN CLINIC | Facility: CLINIC | Age: 45
End: 2025-06-04
Payer: COMMERCIAL

## 2025-06-04 VITALS — WEIGHT: 193 LBS | BODY MASS INDEX: 24.77 KG/M2 | HEIGHT: 74 IN

## 2025-06-04 DIAGNOSIS — M25.562 ACUTE PAIN OF LEFT KNEE: Primary | ICD-10-CM

## 2025-06-04 DIAGNOSIS — M85.862 OTHER SPECIFIED DISORDERS OF BONE DENSITY AND STRUCTURE, LEFT LOWER LEG: ICD-10-CM

## 2025-06-04 DIAGNOSIS — R26.9 ABNORMAL GAIT: ICD-10-CM

## 2025-06-04 PROCEDURE — 99203 OFFICE O/P NEW LOW 30 MIN: CPT | Performed by: ORTHOPAEDIC SURGERY

## 2025-06-04 NOTE — PROGRESS NOTES
Assessment:     1. Acute pain of left knee    2. Abnormal gait    3. Other specified disorders of bone density and structure, left lower leg        Plan:     Problem List Items Addressed This Visit          Care Coordination    Abnormal gait    Relevant Orders    Ambulatory Referral to Neurology       Surgery/Wound/Pain    Acute pain of left knee - Primary    Findings consistent with acute left knee pain, concern for stress fracture versus ligamentous injury.  Findings and treatment options were discussed with the patient.  X-rays were reviewed with him.  Patient does have very abnormal gait but has never been worked up.  He also has very thin cortex is on his x-rays.  He does have a history of alcohol abuse, but no other known neurological issues.  At this time we will go ahead and order an MRI of the left knee to further evaluate the joint due to recent injury.  He was also fitted for hinged knee brace to use with activities.  We will send him for a DEXA scan due to osteoporotic appearance of bones on x-ray.  He was also referred to neurology for further workup.  He will follow-up after MRI and I will go over further treat recommendations at that time.  Work note was given to remain out at this time. All patient's questions were answered to his satisfaction.  This note is created using dictation transcription.  It may contain typographical errors, grammatical errors, improperly dictated words, background noise and other errors.         Relevant Orders    MRI knee left  wo contrast    DXA bone density spine hip and pelvis    Durable Medical Equipment     Other Visit Diagnoses         Other specified disorders of bone density and structure, left lower leg        Relevant Orders    DXA bone density spine hip and pelvis           Subjective:     Patient ID: Gabriel Juarez is a 45 y.o. male.  Chief Complaint:  This is a 45-year-old white male accompanied by his significant other here for evaluation of his left knee.   He had an injury on May 24, 2025.  He states he was climbing into his bed and had his left leg tucked underneath him.  He put all his weight through his left leg and knee and felt a tearing sensation in his left knee.  He felt immediate pain and had difficulty weightbearing initially.  He then was able to weight-bear with a walker.  He continues to have pain mostly over the medial aspect of his knee.  He is able to walk without assistance at this time but continues to have pain.  No prior injury to that knee.  Patient does have a history of alcohol abuse and has been in physical therapy for weakness and abnormal gait  Prior to this injury.  He states he does not have any known history of any neurological conditions.  He has never seen a neurologist.    Allergy:  Allergies[1]  Medications:  all current active meds have been reviewed  Past Medical History:  Past Medical History[2]  Past Surgical History:  Past Surgical History[3]  Family History:  Family History[4]  Social History:  Social History     Substance and Sexual Activity   Alcohol Use Not Currently    Alcohol/week: 12.0 standard drinks of alcohol    Types: 12 Cans of beer per week    Comment: Per Allscripts: Alcohol Abuse in remission - Onset Date 18May2009     Social History     Substance and Sexual Activity   Drug Use No     Tobacco Use History[5]  Review of Systems   Constitutional: Negative.    HENT: Negative.     Eyes: Negative.    Respiratory: Negative.     Cardiovascular: Negative.    Gastrointestinal: Negative.    Endocrine: Negative.    Genitourinary: Negative.    Musculoskeletal:  Positive for arthralgias (left knee), gait problem (shuffling) and joint swelling (left knee).   Skin: Negative.    Allergic/Immunologic: Negative.    Hematological: Negative.    Psychiatric/Behavioral: Negative.           Objective:  BP Readings from Last 1 Encounters:   01/08/25 125/85      Wt Readings from Last 1 Encounters:   06/04/25 87.5 kg (193 lb)      BMI:  "  Estimated body mass index is 24.78 kg/m² as calculated from the following:    Height as of this encounter: 6' 2\" (1.88 m).    Weight as of this encounter: 87.5 kg (193 lb).  BSA:   Estimated body surface area is 2.14 meters squared as calculated from the following:    Height as of this encounter: 6' 2\" (1.88 m).    Weight as of this encounter: 87.5 kg (193 lb).   Physical Exam  Vitals and nursing note reviewed.   Constitutional:       General: He is not in acute distress.     Appearance: Normal appearance. He is well-developed.   HENT:      Head: Normocephalic and atraumatic.      Right Ear: External ear normal.      Left Ear: External ear normal.     Eyes:      Extraocular Movements: Extraocular movements intact.      Conjunctiva/sclera: Conjunctivae normal.     Pulmonary:      Effort: Pulmonary effort is normal. No respiratory distress.     Musculoskeletal:      Cervical back: Neck supple.      Left knee: Effusion (Grade 1) present.      Instability Tests: Medial Yg test positive. Lateral Yg test negative.     Skin:     General: Skin is warm and dry.     Neurological:      Mental Status: He is alert and oriented to person, place, and time.     Psychiatric:         Mood and Affect: Mood normal.         Behavior: Behavior normal.       Left Knee Exam     Tenderness   Left knee tenderness location: diffuse medial.    Range of Motion   Extension:  normal   Flexion:  120     Tests   Yg:  Medial - positive Lateral - negative  Varus: negative Valgus: negative  Lachman:  Anterior - negative      Drawer:  Anterior - negative     Posterior - negative  Patellar apprehension: negative    Other   Erythema: absent  Scars: absent  Sensation: normal  Pulse: present  Swelling: mild  Effusion: effusion (Grade 1) present           I have personally reviewed pertinent films in PACS and my interpretation is x-rays of the left knee reveal no acute fractures.  There is thinning of the cortex.    Scribe Attestation  "     I,:  Violet Manzanares PA-C am acting as a scribe while in the presence of the attending physician.:       I,:  Ana Rodriguez MD personally performed the services described in this documentation    as scribed in my presence.:                   [1]   Allergies  Allergen Reactions    Ceclor [Cefaclor] Anaphylaxis    Cephalosporins Anaphylaxis    Shellfish-Derived Products - Food Allergy Other (See Comments)     GI symptoms    Charentais Melon (Kinyarwanda Melon) Itching and Other (See Comments)     Itchy throat   [2]   Past Medical History:  Diagnosis Date    Alcohol abuse, in remission     Onset: 18May 2009.     Tinnitus 2000    Tonsillitis 06/24   [3]   Past Surgical History:  Procedure Laterality Date    [place] TONSILLECTOMY PRIMARY/SECONDARY AGE 14/> N/A 1/8/2025    Procedure: TONSILLECTOMY;  Surgeon: Yuval Pollard MD;  Location: [facility];  Service: ENT   [4]   Family History  Problem Relation Name Age of Onset    No Known Problems Mother     [5]   Social History  Tobacco Use   Smoking Status Former    Current packs/day: 1.00    Average packs/day: 1 pack/day for 15.0 years (15.0 ttl pk-yrs)    Types: Cigarettes   Smokeless Tobacco Never   Tobacco Comments    Per Allscripts: Never Smoker

## 2025-06-04 NOTE — ASSESSMENT & PLAN NOTE
Findings consistent with acute left knee pain, concern for stress fracture versus ligamentous injury.  Findings and treatment options were discussed with the patient.  X-rays were reviewed with him.  Patient does have very abnormal gait but has never been worked up.  He also has very thin cortex is on his x-rays.  He does have a history of alcohol abuse, but no other known neurological issues.  At this time we will go ahead and order an MRI of the left knee to further evaluate the joint due to recent injury.  He was also fitted for hinged knee brace to use with activities.  We will send him for a DEXA scan due to osteoporotic appearance of bones on x-ray.  He was also referred to neurology for further workup.  He will follow-up after MRI and I will go over further treat recommendations at that time.  Work note was given to remain out at this time. All patient's questions were answered to his satisfaction.  This note is created using dictation transcription.  It may contain typographical errors, grammatical errors, improperly dictated words, background noise and other errors.

## 2025-06-04 NOTE — LETTER
June 4, 2025     Patient: Gabriel Juarez   YOB: 1980   Date of Visit: 6/4/2025       To Whom It May Concern:    It is my medical opinion that Gabriel Juarez should remain out of work until reevaluated after MRI of left knee that will be completed in 1-2 weeks.    If you have any questions or concerns, please don't hesitate to call.         Sincerely,        Ana Rodriguez MD    CC: No Recipients

## 2025-06-18 ENCOUNTER — HOSPITAL ENCOUNTER (OUTPATIENT)
Dept: BONE DENSITY | Facility: IMAGING CENTER | Age: 45
Discharge: HOME | End: 2025-06-18
Attending: PHYSICIAN ASSISTANT
Payer: COMMERCIAL

## 2025-06-18 ENCOUNTER — HOSPITAL ENCOUNTER (OUTPATIENT)
Dept: MRI IMAGING | Facility: HOSPITAL | Age: 45
Discharge: HOME | End: 2025-06-18
Attending: PHYSICIAN ASSISTANT
Payer: COMMERCIAL

## 2025-06-18 VITALS — BODY MASS INDEX: 24.38 KG/M2 | WEIGHT: 180 LBS | HEIGHT: 72 IN

## 2025-06-18 DIAGNOSIS — M25.562 ACUTE PAIN OF LEFT KNEE: ICD-10-CM

## 2025-06-18 DIAGNOSIS — M85.862 OTHER SPECIFIED DISORDERS OF BONE DENSITY AND STRUCTURE, LEFT LOWER LEG: ICD-10-CM

## 2025-06-18 PROCEDURE — 77080 DXA BONE DENSITY AXIAL: CPT

## 2025-06-18 PROCEDURE — 73721 MRI JNT OF LWR EXTRE W/O DYE: CPT

## 2025-06-23 ENCOUNTER — TELEPHONE (OUTPATIENT)
Dept: NEUROLOGY | Facility: CLINIC | Age: 45
End: 2025-06-23

## 2025-06-23 NOTE — TELEPHONE ENCOUNTER
Called patient to confirm upcoming appointment on 7/3 with Bowen Connor in the [place] office.  Pt confirmed appt

## 2025-06-26 ENCOUNTER — OFFICE VISIT (OUTPATIENT)
Dept: OBGYN CLINIC | Facility: CLINIC | Age: 45
End: 2025-06-26
Payer: COMMERCIAL

## 2025-06-26 ENCOUNTER — PREP FOR PROCEDURE (OUTPATIENT)
Dept: OBGYN CLINIC | Facility: CLINIC | Age: 45
End: 2025-06-26

## 2025-06-26 VITALS — HEIGHT: 74 IN | BODY MASS INDEX: 23.61 KG/M2 | WEIGHT: 184 LBS

## 2025-06-26 DIAGNOSIS — Z01.818 PRE-OP TESTING: ICD-10-CM

## 2025-06-26 DIAGNOSIS — S83.212A BUCKET-HANDLE TEAR OF MEDIAL MENISCUS OF LEFT KNEE AS CURRENT INJURY, INITIAL ENCOUNTER: Primary | ICD-10-CM

## 2025-06-26 PROCEDURE — 99214 OFFICE O/P EST MOD 30 MIN: CPT | Performed by: ORTHOPAEDIC SURGERY

## 2025-06-26 RX ORDER — CHLORHEXIDINE GLUCONATE 40 MG/ML
SOLUTION TOPICAL DAILY PRN
OUTPATIENT
Start: 2025-06-26

## 2025-06-26 RX ORDER — CHLORHEXIDINE GLUCONATE ORAL RINSE 1.2 MG/ML
15 SOLUTION DENTAL ONCE
OUTPATIENT
Start: 2025-06-26 | End: 2025-06-26

## 2025-06-26 RX ORDER — SODIUM CHLORIDE, SODIUM LACTATE, POTASSIUM CHLORIDE, CALCIUM CHLORIDE 600; 310; 30; 20 MG/100ML; MG/100ML; MG/100ML; MG/100ML
100 INJECTION, SOLUTION INTRAVENOUS CONTINUOUS
OUTPATIENT
Start: 2025-06-26

## 2025-06-26 NOTE — H&P (VIEW-ONLY)
Assessment:     1. Bucket-handle tear of medial meniscus of left knee as current injury, initial encounter    2. Pre-op testing        Plan:     Problem List Items Addressed This Visit          Musculoskeletal and Integument    Bucket-handle tear of medial meniscus of left knee as current injury - Primary    Findings consistent with left knee medial meniscus tear, bucket-handle.  Discussed findings and treatment options with the patient.  I reviewed patient's left knee MRI and radiology report with him and his wife.  I discussed prognosis of his injury.  I recommended surgical intervention with left knee arthroscopy partial meniscectomy versus repair.  Patient would like to proceed with surgical treatment.  I informed patient that there is a high chance that repair is not possible.  We will schedule patient as outpatient procedure.  All patient's questions were answered to their satisfaction.  This note is created using dictation transcription.  It may contain typographical errors, grammatical errors, improperly dictated words, background noise and other errors.    Discussed with patient surgical risks and complications including but not limited to infection, persistent pain, nerve and vessel injury, blood loss, complications associated with anesthesia, DVT, exposure to COVID virus, etc.  Patient understands the risks and complication and consented to the surgery.         Relevant Orders    Case request operating room: ARTHROSCOPY KNEE, LEFT PARTIAL MEDIAL MENISCECTOMY VS REPAIR (Completed)    Basic metabolic panel    CBC and Platelet    Crutches     Other Visit Diagnoses         Pre-op testing        Relevant Orders    CBC and Platelet           Subjective:     Patient ID: Gabriel Juarez is a 45 y.o. male.  Chief Complaint:  This is a 45-year-old white male accompanied by his significant other here for evaluation of his left knee.  He had an injury on May 24, 2025.  He states he was climbing into his bed and had  his left leg tucked underneath him.  He put all his weight through his left leg and knee and felt a tearing sensation in his left knee.  He felt immediate pain and had difficulty weightbearing initially.  He then was able to weight-bear with a walker.  He continues to have pain mostly over the medial aspect of his knee.  He is able to walk without assistance at this time but continues to have pain.  No prior injury to that knee.  Patient has been ambulating without use of any assist device.  He still has pain in his knee with his activities.  He has been wearing knee braces when he ambulates.  No locking or giving away.  Patient is here to review his left knee MRI.    Allergy:  Allergies[1]  Medications:  all current active meds have been reviewed  Past Medical History:  Past Medical History[2]  Past Surgical History:  Past Surgical History[3]  Family History:  Family History[4]  Social History:  Social History     Substance and Sexual Activity   Alcohol Use Not Currently    Alcohol/week: 12.0 standard drinks of alcohol    Types: 12 Cans of beer per week    Comment: Per Allscripts: Alcohol Abuse in remission - Onset Date [id number]     Social History     Substance and Sexual Activity   Drug Use No     Tobacco Use History[5]  Review of Systems   Constitutional: Negative.    HENT: Negative.     Eyes: Negative.    Respiratory: Negative.     Cardiovascular: Negative.    Gastrointestinal: Negative.    Endocrine: Negative.    Genitourinary: Negative.    Musculoskeletal:  Positive for arthralgias (left knee), gait problem (shuffling) and joint swelling (left knee).   Skin: Negative.    Allergic/Immunologic: Negative.    Hematological: Negative.    Psychiatric/Behavioral: Negative.           Objective:  BP Readings from Last 1 Encounters:   01/08/25 125/85      Wt Readings from Last 1 Encounters:   06/26/25 83.5 kg (184 lb)      BMI:   Estimated body mass index is 23.62 kg/m² as calculated from the following:    Height as of  "this encounter: 6' 2\" (1.88 m).    Weight as of this encounter: 83.5 kg (184 lb).  BSA:   Estimated body surface area is 2.1 meters squared as calculated from the following:    Height as of this encounter: 6' 2\" (1.88 m).    Weight as of this encounter: 83.5 kg (184 lb).   Physical Exam  Vitals and nursing note reviewed.   Constitutional:       General: He is not in acute distress.     Appearance: Normal appearance. He is well-developed.   HENT:      Head: Normocephalic and atraumatic.      Right Ear: External ear normal.      Left Ear: External ear normal.     Eyes:      Extraocular Movements: Extraocular movements intact.      Conjunctiva/sclera: Conjunctivae normal.      Pupils: Pupils are equal, round, and reactive to light.       Cardiovascular:      Rate and Rhythm: Regular rhythm.      Heart sounds: Normal heart sounds. No murmur heard.     No gallop.   Pulmonary:      Effort: Pulmonary effort is normal. No respiratory distress.      Breath sounds: Normal breath sounds. No wheezing or rales.   Abdominal:      Palpations: Abdomen is soft.     Musculoskeletal:      Cervical back: Normal range of motion and neck supple.      Left knee: Effusion (Grade 1) present.      Instability Tests: Medial Yg test positive. Lateral Yg test negative.     Skin:     General: Skin is warm and dry.     Neurological:      Mental Status: He is alert and oriented to person, place, and time.     Psychiatric:         Mood and Affect: Mood normal.         Behavior: Behavior normal.       Left Knee Exam     Tenderness   Left knee tenderness location: diffuse medial.    Range of Motion   Extension:  normal   Flexion:  120     Tests   Yg:  Medial - positive Lateral - negative  Varus: negative Valgus: negative  Lachman:  Anterior - negative      Drawer:  Anterior - negative     Posterior - negative  Patellar apprehension: negative    Other   Erythema: absent  Scars: absent  Sensation: normal  Pulse: present  Swelling: " mild  Effusion: effusion (Grade 1) present           I have personally reviewed pertinent films in PACS and my interpretation is MRI left knee show medial meniscus tear displaced into the inter-condylar notch, question of bucket-handle tear.       [1]   Allergies  Allergen Reactions    Ceclor [Cefaclor] Anaphylaxis    Cephalosporins Anaphylaxis    Shellfish-Derived Products - Food Allergy Other (See Comments)     GI symptoms    Charentais Melon (Emirati Melon) Itching and Other (See Comments)     Itchy throat   [2]   Past Medical History:  Diagnosis Date    Alcohol abuse, in remission     Onset: 18May 2009.     Tinnitus 2000    Tonsillitis 06/24   [3]   Past Surgical History:  Procedure Laterality Date    [place] TONSILLECTOMY PRIMARY/SECONDARY AGE 14/> N/A 1/8/2025    Procedure: TONSILLECTOMY;  Surgeon: Yuval Pollard MD;  Location: [facility];  Service: ENT   [4]   Family History  Problem Relation Name Age of Onset    No Known Problems Mother     [5]   Social History  Tobacco Use   Smoking Status Former    Current packs/day: 1.00    Average packs/day: 1 pack/day for 15.0 years (15.0 ttl pk-yrs)    Types: Cigarettes   Smokeless Tobacco Never   Tobacco Comments    Per Allscripts: Never Smoker

## 2025-06-26 NOTE — PROGRESS NOTES
Assessment:     1. Bucket-handle tear of medial meniscus of left knee as current injury, initial encounter    2. Pre-op testing        Plan:     Problem List Items Addressed This Visit          Musculoskeletal and Integument    Bucket-handle tear of medial meniscus of left knee as current injury - Primary    Findings consistent with left knee medial meniscus tear, bucket-handle.  Discussed findings and treatment options with the patient.  I reviewed patient's left knee MRI and radiology report with him and his wife.  I discussed prognosis of his injury.  I recommended surgical intervention with left knee arthroscopy partial meniscectomy versus repair.  Patient would like to proceed with surgical treatment.  I informed patient that there is a high chance that repair is not possible.  We will schedule patient as outpatient procedure.  All patient's questions were answered to their satisfaction.  This note is created using dictation transcription.  It may contain typographical errors, grammatical errors, improperly dictated words, background noise and other errors.    Discussed with patient surgical risks and complications including but not limited to infection, persistent pain, nerve and vessel injury, blood loss, complications associated with anesthesia, DVT, exposure to COVID virus, etc.  Patient understands the risks and complication and consented to the surgery.         Relevant Orders    Case request operating room: ARTHROSCOPY KNEE, LEFT PARTIAL MEDIAL MENISCECTOMY VS REPAIR (Completed)    Basic metabolic panel    CBC and Platelet    Crutches     Other Visit Diagnoses         Pre-op testing        Relevant Orders    CBC and Platelet           Subjective:     Patient ID: Gabriel Juarez is a 45 y.o. male.  Chief Complaint:  This is a 45-year-old white male accompanied by his significant other here for evaluation of his left knee.  He had an injury on May 24, 2025.  He states he was climbing into his bed and had  his left leg tucked underneath him.  He put all his weight through his left leg and knee and felt a tearing sensation in his left knee.  He felt immediate pain and had difficulty weightbearing initially.  He then was able to weight-bear with a walker.  He continues to have pain mostly over the medial aspect of his knee.  He is able to walk without assistance at this time but continues to have pain.  No prior injury to that knee.  Patient has been ambulating without use of any assist device.  He still has pain in his knee with his activities.  He has been wearing knee braces when he ambulates.  No locking or giving away.  Patient is here to review his left knee MRI.    Allergy:  Allergies[1]  Medications:  all current active meds have been reviewed  Past Medical History:  Past Medical History[2]  Past Surgical History:  Past Surgical History[3]  Family History:  Family History[4]  Social History:  Social History     Substance and Sexual Activity   Alcohol Use Not Currently    Alcohol/week: 12.0 standard drinks of alcohol    Types: 12 Cans of beer per week    Comment: Per Allscripts: Alcohol Abuse in remission - Onset Date [id number]     Social History     Substance and Sexual Activity   Drug Use No     Tobacco Use History[5]  Review of Systems   Constitutional: Negative.    HENT: Negative.     Eyes: Negative.    Respiratory: Negative.     Cardiovascular: Negative.    Gastrointestinal: Negative.    Endocrine: Negative.    Genitourinary: Negative.    Musculoskeletal:  Positive for arthralgias (left knee), gait problem (shuffling) and joint swelling (left knee).   Skin: Negative.    Allergic/Immunologic: Negative.    Hematological: Negative.    Psychiatric/Behavioral: Negative.           Objective:  BP Readings from Last 1 Encounters:   01/08/25 125/85      Wt Readings from Last 1 Encounters:   06/26/25 83.5 kg (184 lb)      BMI:   Estimated body mass index is 23.62 kg/m² as calculated from the following:    Height as of  "this encounter: 6' 2\" (1.88 m).    Weight as of this encounter: 83.5 kg (184 lb).  BSA:   Estimated body surface area is 2.1 meters squared as calculated from the following:    Height as of this encounter: 6' 2\" (1.88 m).    Weight as of this encounter: 83.5 kg (184 lb).   Physical Exam  Vitals and nursing note reviewed.   Constitutional:       General: He is not in acute distress.     Appearance: Normal appearance. He is well-developed.   HENT:      Head: Normocephalic and atraumatic.      Right Ear: External ear normal.      Left Ear: External ear normal.     Eyes:      Extraocular Movements: Extraocular movements intact.      Conjunctiva/sclera: Conjunctivae normal.      Pupils: Pupils are equal, round, and reactive to light.       Cardiovascular:      Rate and Rhythm: Regular rhythm.      Heart sounds: Normal heart sounds. No murmur heard.     No gallop.   Pulmonary:      Effort: Pulmonary effort is normal. No respiratory distress.      Breath sounds: Normal breath sounds. No wheezing or rales.   Abdominal:      Palpations: Abdomen is soft.     Musculoskeletal:      Cervical back: Normal range of motion and neck supple.      Left knee: Effusion (Grade 1) present.      Instability Tests: Medial Yg test positive. Lateral Yg test negative.     Skin:     General: Skin is warm and dry.     Neurological:      Mental Status: He is alert and oriented to person, place, and time.     Psychiatric:         Mood and Affect: Mood normal.         Behavior: Behavior normal.       Left Knee Exam     Tenderness   Left knee tenderness location: diffuse medial.    Range of Motion   Extension:  normal   Flexion:  120     Tests   Yg:  Medial - positive Lateral - negative  Varus: negative Valgus: negative  Lachman:  Anterior - negative      Drawer:  Anterior - negative     Posterior - negative  Patellar apprehension: negative    Other   Erythema: absent  Scars: absent  Sensation: normal  Pulse: present  Swelling: " mild  Effusion: effusion (Grade 1) present           I have personally reviewed pertinent films in PACS and my interpretation is MRI left knee show medial meniscus tear displaced into the inter-condylar notch, question of bucket-handle tear.       [1]   Allergies  Allergen Reactions    Ceclor [Cefaclor] Anaphylaxis    Cephalosporins Anaphylaxis    Shellfish-Derived Products - Food Allergy Other (See Comments)     GI symptoms    Charentais Melon (Egyptian Melon) Itching and Other (See Comments)     Itchy throat   [2]   Past Medical History:  Diagnosis Date    Alcohol abuse, in remission     Onset: 18May 2009.     Tinnitus 2000    Tonsillitis 06/24   [3]   Past Surgical History:  Procedure Laterality Date    UT TONSILLECTOMY PRIMARY/SECONDARY AGE 14/> N/A 1/8/2025    Procedure: TONSILLECTOMY;  Surgeon: Yuval Pollard MD;  Location: [facility];  Service: ENT   [4]   Family History  Problem Relation Name Age of Onset    No Known Problems Mother     [5]   Social History  Tobacco Use   Smoking Status Former    Current packs/day: 1.00    Average packs/day: 1 pack/day for 15.0 years (15.0 ttl pk-yrs)    Types: Cigarettes   Smokeless Tobacco Never   Tobacco Comments    Per Allscripts: Never Smoker

## 2025-06-26 NOTE — ASSESSMENT & PLAN NOTE
Findings consistent with left knee medial meniscus tear, bucket-handle.  Discussed findings and treatment options with the patient.  I reviewed patient's left knee MRI and radiology report with him and his wife.  I discussed prognosis of his injury.  I recommended surgical intervention with left knee arthroscopy partial meniscectomy versus repair.  Patient would like to proceed with surgical treatment.  I informed patient that there is a high chance that repair is not possible.  We will schedule patient as outpatient procedure.  All patient's questions were answered to their satisfaction.  This note is created using dictation transcription.  It may contain typographical errors, grammatical errors, improperly dictated words, background noise and other errors.    Discussed with patient surgical risks and complications including but not limited to infection, persistent pain, nerve and vessel injury, blood loss, complications associated with anesthesia, DVT, exposure to COVID virus, etc.  Patient understands the risks and complication and consented to the surgery.

## 2025-07-03 ENCOUNTER — CONSULT (OUTPATIENT)
Dept: NEUROLOGY | Facility: CLINIC | Age: 45
End: 2025-07-03
Attending: PHYSICIAN ASSISTANT
Payer: COMMERCIAL

## 2025-07-03 VITALS
TEMPERATURE: 97.5 F | WEIGHT: 187.2 LBS | SYSTOLIC BLOOD PRESSURE: 126 MMHG | HEART RATE: 70 BPM | BODY MASS INDEX: 24.02 KG/M2 | OXYGEN SATURATION: 99 % | DIASTOLIC BLOOD PRESSURE: 72 MMHG | HEIGHT: 74 IN

## 2025-07-03 DIAGNOSIS — R47.9 DIFFICULTY WITH SPEECH: Primary | ICD-10-CM

## 2025-07-03 DIAGNOSIS — R26.9 ABNORMAL GAIT: ICD-10-CM

## 2025-07-03 DIAGNOSIS — F10.11 HISTORY OF ALCOHOL ABUSE: ICD-10-CM

## 2025-07-03 PROCEDURE — 99243 OFF/OP CNSLTJ NEW/EST LOW 30: CPT | Performed by: NURSE PRACTITIONER

## 2025-07-03 NOTE — PROGRESS NOTES
Name: Gabriel Juarez      : 1980      MRN: [id number]  Encounter Provider: LORETTA Jo  Encounter Date: 7/3/2025   Encounter department: [facility]  :  Assessment & Plan  Abnormal gait    Orders:    Ambulatory Referral to Neurology    Vitamin B1, whole blood; Future    Vitamin B12; Future    Vitamin B6; Future    Folate; Future    Difficulty with speech    Orders:    Vitamin B1, whole blood; Future    Vitamin B12; Future    Vitamin B6; Future    Folate; Future    History of alcohol abuse    Orders:    Vitamin B1, whole blood; Future    Vitamin B12; Future    Vitamin B6; Future    Folate; Future      Assessment & Plan  1. Ataxia.  The patient's ataxia symptoms, including balance issues and slurred speech, have persisted despite periods of sobriety. There is a family history of neurological conditions, including MS and brain cancer. The MRI images will be requested for further review. If any concerns arise from this review, a repeat MRI with contrast may be considered to investigate potential cerebellar issues related to alcohol use. He is advised to continue his supplementation regimen and strive for long-term alcohol avoidance. If his condition does not improve, further investigation into the function of the nerves in his legs and arms may be warranted.    2. Alcohol Use Disorder.  The patient has been struggling with alcohol use disorder for the past three years, with periods of binge drinking followed by periods of sobriety. He is advised to continue working towards long-term sobriety. The potential impact of alcohol on his neurological symptoms was discussed. If he continues to struggle with alcohol use, further interventions such as counseling or medication-assisted treatment may be considered.    3. Attention Deficit Hyperactivity Disorder (ADHD).  The patient reports that his focus has been more scattered over the past year, which he attributes to situational  factors rather than neurological ones. He has not been working much recently due to his knee injury. He has been on medication for ADHD, which he finds helpful for maintaining focus at work, but he has not needed it recently.    4. Depression and anxiety.  He has been experiencing depression and anxiety, which he believes are situational. He is under financial stress due to his inability to work consistently.    Follow up in 4 months or sooner if needed.    History of Present Illness   History of Present Illness  The patient presents for evaluation of ataxia, ADHD, and alcohol use disorder.    He has been grappling with alcohol use disorder for the past 3 years, characterized by alternating periods of heavy drinking and sobriety. His alcohol consumption includes up to a full 750 ml of hard liquor daily during binge periods. Over the past year, he has experienced episodes of ataxia during and after these drinking periods, manifesting as balance issues, leg weakness, and slurred speech. These symptoms persist even during his sober periods, although they improve slightly. He has undergone physical therapy for suspected alcohol-induced ketoacidosis, which was thought to be causing muscle wasting and gait issues. His condition improved with therapy, but not to the extent he had hoped.    He reports no cognitive issues or drug use. He has not experienced any falls recently but has a history of head traumas from his drinking days, which required multiple CT scans and MRIs. He has been hospitalized several times due to alcohol-related issues, including detoxification and ketoacidosis. He occasionally uses Ativan, particularly during detoxification periods, but has not taken it in the past 3 weeks. He has noticed that fatigue exacerbates his symptoms, and excessive caffeine intake worsens his speech and tremors. He has been taking B1 and folate supplements daily, along with multivitamins and calcium. He also takes B12 a few  times a week.    He reports no neuropathic pain in his feet. He has a history of metacarpal fractures and ligament injuries but no other bone fractures. He experiences tremors when fatigued and finds that caffeine worsens his symptoms. He has always had overpronation, but it has worsened in recent years, causing his ankles to roll more frequently. Orthotics have been beneficial.    His focus has been more scattered over the past year, which he attributes to situational factors rather than neurological ones. He has not been working much recently due to his knee injury. He has been on medication for ADHD, which he finds helpful for maintaining focus at work, but he has not needed it recently.    He has been diagnosed with osteoporosis following a DEXA scan and is scheduled for meniscus repair surgery in 2 weeks. He has a history of knee injury and is currently experiencing left knee pain.    He has been experiencing depression and anxiety, which he believes are situational. He is under financial stress due to his inability to work consistently.    SOCIAL HISTORY:    Occupations: Travel ER nurse for about 3 years    Coffee/Tea/Caffeine-containing Drinks: Excessive caffeine intake worsens symptoms    Sleep: Generally sleeps okay, does not take anything for sleep    PAST SURGICAL HISTORY:  tonsillectomy back in 01/2025. In 06/2024, he had mono, strep, MRSA, staph, and tonsillar abscess.    FAMILY HISTORY  - Maternal aunt: Multiple Sclerosis (MS)  - Maternal grandmother: Brain cancer  Negative for neuropathy and gout.    MEDICATIONS  CURRENT MEDS:  Multivitamin Daily  Thiamine Daily  Calcium  B12 A couple times a week  Folic Acid Daily  Magnesium  Wellbutrin  Adderall     PREVIOUS MEDS:  Ativan Occasionally  End Date: 3 weeks ago  Reason for Discontinuation: No longer needed    MRI brain without 8/1/2024:  FINDINGS:      There is a normal-appearing brain. There is no mass or hemorrhage. The    ventricles and subarachnoid  "spaces are normal. There are normal flow voids in    the vessels at the skull base. No structural abnormality or pathologic signal    alteration is seen. There are no extra-axial fluid collections. The visualized    portions of the sinuses and globes are normal. There is no restricted diffusion.    IMPRESSION:    Normal unenhanced brain MRI       HPI   Review of Systems   Constitutional: Negative.    HENT: Negative.     Eyes: Negative.    Respiratory: Negative.     Cardiovascular: Negative.    Gastrointestinal: Negative.    Endocrine: Negative.    Genitourinary: Negative.    Musculoskeletal:  Positive for gait problem.   Skin: Negative.    Allergic/Immunologic: Negative.    Neurological:  Positive for speech difficulty (speech is halted at times) and weakness.   Hematological: Negative.    Psychiatric/Behavioral: Negative.      I have personally reviewed the MA's review of systems and made changes as necessary.    Medications Ordered Prior to Encounter[1]   Social History[2]     Objective     /72 (BP Location: Right arm, Patient Position: Sitting, Cuff Size: Standard)   Pulse 70   Temp 97.5 °F (36.4 °C) (Temporal)   Ht 6' 2\" (1.88 m)   Wt 84.9 kg (187 lb 3.2 oz)   SpO2 99%   BMI 24.04 kg/m²     Physical Exam  Vitals reviewed.   Constitutional:       General: He is not in acute distress.  HENT:      Head: Normocephalic and atraumatic.      Right Ear: External ear normal.      Left Ear: External ear normal.      Nose: Nose normal.      Mouth/Throat:      Pharynx: Oropharynx is clear.     Eyes:      General:         Right eye: No discharge.         Left eye: No discharge.      Extraocular Movements: Extraocular movements intact.      Pupils: Pupils are equal, round, and reactive to light.       Cardiovascular:      Rate and Rhythm: Normal rate and regular rhythm.      Pulses: Normal pulses.      Heart sounds: Normal heart sounds.   Pulmonary:      Effort: Pulmonary effort is normal.      Breath sounds: " Normal breath sounds.   Abdominal:      General: There is no distension.      Tenderness: There is no abdominal tenderness.     Musculoskeletal:      Cervical back: Normal range of motion.      Right lower leg: No edema.      Left lower leg: No edema.     Skin:     General: Skin is warm.      Findings: No rash.     Neurological:      Mental Status: He is alert.      Sensory: Sensory deficit (hypersensitivity to feet pinprick) present.      Coordination: Coordination abnormal (finger to nose testing and heel to shin testing normal; there was a mild tremor related to posture that was more prominent on the left side).      Gait: Gait abnormal (antalgic gait; related to knee issue).      Deep Tendon Reflexes: Reflexes normal.     Psychiatric:         Mood and Affect: Mood normal.       Neurological Exam  Mental Status  Alert.    Cranial Nerves  CN III, IV, VI: Extraocular movements intact bilaterally. Pupils equal round and reactive to light bilaterally.    Gait   Abnormal gait (antalgic gait; related to knee issue).             [1]   Current Outpatient Medications on File Prior to Visit   Medication Sig Dispense Refill    amphetamine-dextroamphetamine (ADDERALL, 10MG,) 10 mg tablet Take 1 tablet (10 mg total) by mouth 2 (two) times a day Max Daily Amount: 20 mg 60 tablet 0    buPROPion (WELLBUTRIN XL) 150 mg 24 hr tablet Take 1 tablet (150 mg total) by mouth every morning 90 tablet 1    hydrOXYzine (VISTARIL) 100 MG capsule Take 1 capsule (100 mg total) by mouth 3 (three) times a day as needed for itching 90 capsule 1    LORazepam (ATIVAN) 1 mg tablet Take 1 tablet (1 mg total) by mouth every 8 (eight) hours as needed for anxiety 60 tablet 0    Thiamine HCl (VITAMIN B-1 PO) Take 1 Dose by mouth daily Pt takes 1000mg daily as a liquid       No current facility-administered medications on file prior to visit.   [2]   Social History  Tobacco Use    Smoking status: Former     Current packs/day: 1.00     Average packs/day:  1 pack/day for 15.0 years (15.0 ttl pk-yrs)     Types: Cigarettes    Smokeless tobacco: Never    Tobacco comments:     Per Allscripts: Never Smoker   Vaping Use    Vaping status: Every Day    Substances: Nicotine   Substance and Sexual Activity    Alcohol use: Not Currently     Alcohol/week: 12.0 standard drinks of alcohol     Types: 12 Cans of beer per week     Comment: Per Allscripts: Alcohol Abuse in remission - Onset Date 18May2009    Drug use: No    Sexual activity: Yes     Partners: Female     Birth control/protection: Coitus interruptus

## 2025-07-07 NOTE — ASSESSMENT & PLAN NOTE
Orders:    Ambulatory Referral to Neurology    Vitamin B1, whole blood; Future    Vitamin B12; Future    Vitamin B6; Future    Folate; Future

## 2025-07-08 ENCOUNTER — PATIENT MESSAGE (OUTPATIENT)
Dept: FAMILY MEDICINE CLINIC | Facility: CLINIC | Age: 45
End: 2025-07-08

## 2025-07-08 DIAGNOSIS — E29.1 HYPOGONADISM IN MALE: ICD-10-CM

## 2025-07-08 DIAGNOSIS — Z00.00 WELLNESS EXAMINATION: Primary | ICD-10-CM

## 2025-07-11 ENCOUNTER — APPOINTMENT (OUTPATIENT)
Dept: LAB | Facility: HOSPITAL | Age: 45
End: 2025-07-11
Payer: COMMERCIAL

## 2025-07-11 DIAGNOSIS — R47.9 DIFFICULTY WITH SPEECH: ICD-10-CM

## 2025-07-11 DIAGNOSIS — E29.1 HYPOGONADISM IN MALE: ICD-10-CM

## 2025-07-11 DIAGNOSIS — R26.9 ABNORMAL GAIT: ICD-10-CM

## 2025-07-11 DIAGNOSIS — F10.11 HISTORY OF ALCOHOL ABUSE: ICD-10-CM

## 2025-07-11 DIAGNOSIS — S83.212A BUCKET-HANDLE TEAR OF MEDIAL MENISCUS OF LEFT KNEE AS CURRENT INJURY, INITIAL ENCOUNTER: ICD-10-CM

## 2025-07-11 DIAGNOSIS — Z00.00 WELLNESS EXAMINATION: ICD-10-CM

## 2025-07-11 DIAGNOSIS — Z01.818 PRE-OP TESTING: ICD-10-CM

## 2025-07-11 LAB
ANION GAP SERPL CALCULATED.3IONS-SCNC: 13 MMOL/L (ref 4–13)
BUN SERPL-MCNC: 13 MG/DL (ref 5–25)
CALCIUM SERPL-MCNC: 9.5 MG/DL (ref 8.4–10.2)
CHLORIDE SERPL-SCNC: 103 MMOL/L (ref 96–108)
CHOLEST SERPL-MCNC: 217 MG/DL (ref ?–200)
CO2 SERPL-SCNC: 27 MMOL/L (ref 21–32)
CREAT SERPL-MCNC: 0.89 MG/DL (ref 0.6–1.3)
ERYTHROCYTE [DISTWIDTH] IN BLOOD BY AUTOMATED COUNT: 13 % (ref 11.6–15.1)
EST. AVERAGE GLUCOSE BLD GHB EST-MCNC: 111 MG/DL
FOLATE SERPL-MCNC: 16.2 NG/ML
GFR SERPL CREATININE-BSD FRML MDRD: 104 ML/MIN/1.73SQ M
GLUCOSE P FAST SERPL-MCNC: 107 MG/DL (ref 65–99)
HBA1C MFR BLD: 5.5 %
HCT VFR BLD AUTO: 43.4 % (ref 36.5–49.3)
HDLC SERPL-MCNC: 68 MG/DL
HGB BLD-MCNC: 14.5 G/DL (ref 12–17)
LDLC SERPL CALC-MCNC: 134 MG/DL (ref 0–100)
MCH RBC QN AUTO: 30.6 PG (ref 26.8–34.3)
MCHC RBC AUTO-ENTMCNC: 33.4 G/DL (ref 31.4–37.4)
MCV RBC AUTO: 92 FL (ref 82–98)
PLATELET # BLD AUTO: 298 THOUSANDS/UL (ref 149–390)
PMV BLD AUTO: 9.3 FL (ref 8.9–12.7)
POTASSIUM SERPL-SCNC: 3.8 MMOL/L (ref 3.5–5.3)
RBC # BLD AUTO: 4.74 MILLION/UL (ref 3.88–5.62)
SODIUM SERPL-SCNC: 143 MMOL/L (ref 135–147)
TESTOST SERPL-MSCNC: 349 NG/DL (ref 175–781)
TRIGL SERPL-MCNC: 76 MG/DL (ref ?–150)
VIT B12 SERPL-MCNC: 314 PG/ML (ref 180–914)
WBC # BLD AUTO: 5.59 THOUSAND/UL (ref 4.31–10.16)

## 2025-07-11 PROCEDURE — 80061 LIPID PANEL: CPT

## 2025-07-11 PROCEDURE — 83036 HEMOGLOBIN GLYCOSYLATED A1C: CPT

## 2025-07-11 PROCEDURE — 80048 BASIC METABOLIC PNL TOTAL CA: CPT

## 2025-07-11 PROCEDURE — 82746 ASSAY OF FOLIC ACID SERUM: CPT

## 2025-07-11 PROCEDURE — 84425 ASSAY OF VITAMIN B-1: CPT

## 2025-07-11 PROCEDURE — 36415 COLL VENOUS BLD VENIPUNCTURE: CPT

## 2025-07-11 PROCEDURE — 85027 COMPLETE CBC AUTOMATED: CPT

## 2025-07-11 PROCEDURE — 82607 VITAMIN B-12: CPT

## 2025-07-11 PROCEDURE — 84403 ASSAY OF TOTAL TESTOSTERONE: CPT

## 2025-07-11 PROCEDURE — 84207 ASSAY OF VITAMIN B-6: CPT

## 2025-07-14 LAB — VIT B6 SERPL-MCNC: 27.1 UG/L (ref 3.4–65.2)

## 2025-07-14 RX ORDER — IBUPROFEN 200 MG
TABLET ORAL EVERY 6 HOURS PRN
Status: ON HOLD | COMMUNITY
End: 2025-07-18 | Stop reason: HOSPADM

## 2025-07-14 NOTE — PRE-PROCEDURE INSTRUCTIONS
Pre-Surgery Instructions:   Medication Instructions    amphetamine-dextroamphetamine (ADDERALL, 10MG,) 10 mg tablet Uses PRN- DO NOT take day of surgery    B Complex Vitamins (VITAMIN-B COMPLEX PO) Stop taking 7 days prior to surgery.    buPROPion (WELLBUTRIN XL) 150 mg 24 hr tablet Take day of surgery.    hydrOXYzine (VISTARIL) 100 MG capsule Uses PRN- OK to take day of surgery    ibuprofen (Advil) 200 mg tablet Stop taking 3 days prior to surgery.    LORazepam (ATIVAN) 1 mg tablet Uses PRN- OK to take day of surgery    Multiple Vitamin (MULTIVITAMINS PO) Stop taking 7 days prior to surgery.    Medication instructions for day of surgery reviewed. Patient verbalized understanding and agrees with the plan.  Please take all instructed medications with only a sip of water. Please do not take any over the counter (non-prescribed) vitamins or supplements for one week prior to date of surgery.      You will receive a call one business day prior to surgery with an arrival time and hospital directions. If your surgery is scheduled on a Monday, the hospital will be calling you on the Friday prior to your surgery. If you have not heard from anyone by 8pm, please call the hospital supervisor through the hospital  at [phone]. ([place] [phone] or [place] [phone]).    Do not eat or drink anything after midnight the night before your surgery, including candy, mints, lifesavers, or chewing gum. Do not drink alcohol 24hrs before your surgery. Try not to smoke at least 24hrs before your surgery.       Follow the pre surgery showering instructions as listed in the “My Surgical Experience Booklet” or otherwise provided by your surgeon's office. Do not use a blade to shave the surgical area 1 week before surgery. It is okay to use a clean electric clippers up to 24 hours before surgery. Do not apply any lotions, creams, including makeup, cologne, deodorant, or perfumes after showering on the day of your surgery.  Do not use dry shampoo, hair spray, hair gel, or any type of hair products.     No contact lenses, eye make-up, or artificial eyelashes. Remove nail polish, including gel polish, and any artificial, gel, or acrylic nails if possible. Remove all jewelry including rings and body piercing jewelry.     Wear causal clothing that is easy to take on and off. Consider your type of surgery.    Keep any valuables, jewelry, piercings at home. Please bring any specially ordered equipment (sling, braces) if indicated.    Arrange for a responsible person to drive you to and from the hospital on the day of your surgery. Please confirm the visitor policy for the day of your procedure when you receive your phone call with an arrival time.     Call the surgeon's office with any new illnesses, exposures, or additional questions prior to surgery.    Please reference your “My Surgical Experience Booklet” for additional information to prepare for your upcoming surgery.

## 2025-07-15 LAB — VIT B1 BLD-SCNC: 137.9 NMOL/L (ref 66.5–200)

## 2025-07-18 ENCOUNTER — ANESTHESIA EVENT (OUTPATIENT)
Dept: PERIOP | Facility: HOSPITAL | Age: 45
End: 2025-07-18
Payer: COMMERCIAL

## 2025-07-18 ENCOUNTER — ANESTHESIA (OUTPATIENT)
Dept: PERIOP | Facility: HOSPITAL | Age: 45
End: 2025-07-18
Payer: COMMERCIAL

## 2025-07-18 ENCOUNTER — HOSPITAL ENCOUNTER (OUTPATIENT)
Facility: HOSPITAL | Age: 45
Setting detail: OUTPATIENT SURGERY
Discharge: HOME | End: 2025-07-18
Attending: ORTHOPAEDIC SURGERY | Admitting: ORTHOPAEDIC SURGERY
Payer: COMMERCIAL

## 2025-07-18 VITALS
SYSTOLIC BLOOD PRESSURE: 136 MMHG | BODY MASS INDEX: 23.54 KG/M2 | DIASTOLIC BLOOD PRESSURE: 82 MMHG | HEART RATE: 58 BPM | OXYGEN SATURATION: 97 % | TEMPERATURE: 97.3 F | WEIGHT: 183.4 LBS | RESPIRATION RATE: 32 BRPM | HEIGHT: 74 IN

## 2025-07-18 DIAGNOSIS — S83.212D BUCKET-HANDLE TEAR OF MEDIAL MENISCUS OF LEFT KNEE AS CURRENT INJURY, SUBSEQUENT ENCOUNTER: Primary | ICD-10-CM

## 2025-07-18 PROCEDURE — 29882 ARTHRS KNE SRG MNISC RPR M/L: CPT | Performed by: PHYSICIAN ASSISTANT

## 2025-07-18 PROCEDURE — 29882 ARTHRS KNE SRG MNISC RPR M/L: CPT | Performed by: ORTHOPAEDIC SURGERY

## 2025-07-18 PROCEDURE — C1713 ANCHOR/SCREW BN/BN,TIS/BN: HCPCS | Performed by: ORTHOPAEDIC SURGERY

## 2025-07-18 DEVICE — IMPLANTABLE DEVICE: Type: IMPLANTABLE DEVICE | Site: KNEE | Status: FUNCTIONAL

## 2025-07-18 RX ORDER — DIPHENHYDRAMINE HYDROCHLORIDE 50 MG/ML
25 INJECTION, SOLUTION INTRAMUSCULAR; INTRAVENOUS ONCE
Status: COMPLETED | OUTPATIENT
Start: 2025-07-18 | End: 2025-07-18

## 2025-07-18 RX ORDER — ONDANSETRON 2 MG/ML
4 INJECTION INTRAMUSCULAR; INTRAVENOUS ONCE AS NEEDED
Status: DISCONTINUED | OUTPATIENT
Start: 2025-07-18 | End: 2025-07-18 | Stop reason: HOSPADM

## 2025-07-18 RX ORDER — DEXAMETHASONE SODIUM PHOSPHATE 10 MG/ML
INJECTION, SOLUTION INTRAMUSCULAR; INTRAVENOUS AS NEEDED
Status: DISCONTINUED | OUTPATIENT
Start: 2025-07-18 | End: 2025-07-18

## 2025-07-18 RX ORDER — HYDROMORPHONE HCL/PF 1 MG/ML
SYRINGE (ML) INJECTION AS NEEDED
Status: DISCONTINUED | OUTPATIENT
Start: 2025-07-18 | End: 2025-07-18

## 2025-07-18 RX ORDER — ONDANSETRON 2 MG/ML
INJECTION INTRAMUSCULAR; INTRAVENOUS AS NEEDED
Status: DISCONTINUED | OUTPATIENT
Start: 2025-07-18 | End: 2025-07-18

## 2025-07-18 RX ORDER — BUPIVACAINE HYDROCHLORIDE AND EPINEPHRINE 2.5; 5 MG/ML; UG/ML
INJECTION, SOLUTION EPIDURAL; INFILTRATION; INTRACAUDAL; PERINEURAL AS NEEDED
Status: DISCONTINUED | OUTPATIENT
Start: 2025-07-18 | End: 2025-07-18 | Stop reason: HOSPADM

## 2025-07-18 RX ORDER — CLINDAMYCIN PHOSPHATE 900 MG/50ML
900 INJECTION, SOLUTION INTRAVENOUS ONCE
Status: COMPLETED | OUTPATIENT
Start: 2025-07-18 | End: 2025-07-18

## 2025-07-18 RX ORDER — LIDOCAINE HYDROCHLORIDE 10 MG/ML
INJECTION, SOLUTION EPIDURAL; INFILTRATION; INTRACAUDAL; PERINEURAL AS NEEDED
Status: DISCONTINUED | OUTPATIENT
Start: 2025-07-18 | End: 2025-07-18

## 2025-07-18 RX ORDER — OXYCODONE AND ACETAMINOPHEN 5; 325 MG/1; MG/1
1 TABLET ORAL EVERY 4 HOURS PRN
Qty: 15 TABLET | Refills: 0 | Status: SHIPPED | OUTPATIENT
Start: 2025-07-18 | End: 2025-07-18

## 2025-07-18 RX ORDER — FENTANYL CITRATE/PF 50 MCG/ML
50 SYRINGE (ML) INJECTION
Status: DISCONTINUED | OUTPATIENT
Start: 2025-07-18 | End: 2025-07-18 | Stop reason: HOSPADM

## 2025-07-18 RX ORDER — SODIUM CHLORIDE, SODIUM LACTATE, POTASSIUM CHLORIDE, CALCIUM CHLORIDE 600; 310; 30; 20 MG/100ML; MG/100ML; MG/100ML; MG/100ML
100 INJECTION, SOLUTION INTRAVENOUS CONTINUOUS
Status: DISCONTINUED | OUTPATIENT
Start: 2025-07-18 | End: 2025-07-18 | Stop reason: HOSPADM

## 2025-07-18 RX ORDER — MIDAZOLAM HYDROCHLORIDE 2 MG/2ML
INJECTION, SOLUTION INTRAMUSCULAR; INTRAVENOUS AS NEEDED
Status: DISCONTINUED | OUTPATIENT
Start: 2025-07-18 | End: 2025-07-18

## 2025-07-18 RX ORDER — FENTANYL CITRATE 50 UG/ML
INJECTION, SOLUTION INTRAMUSCULAR; INTRAVENOUS AS NEEDED
Status: DISCONTINUED | OUTPATIENT
Start: 2025-07-18 | End: 2025-07-18

## 2025-07-18 RX ORDER — PROPOFOL 10 MG/ML
INJECTION, EMULSION INTRAVENOUS AS NEEDED
Status: DISCONTINUED | OUTPATIENT
Start: 2025-07-18 | End: 2025-07-18

## 2025-07-18 RX ORDER — ACETAMINOPHEN 325 MG/1
650 TABLET ORAL EVERY 6 HOURS PRN
Status: DISCONTINUED | OUTPATIENT
Start: 2025-07-18 | End: 2025-07-18 | Stop reason: HOSPADM

## 2025-07-18 RX ORDER — CHLORHEXIDINE GLUCONATE ORAL RINSE 1.2 MG/ML
15 SOLUTION DENTAL ONCE
Status: COMPLETED | OUTPATIENT
Start: 2025-07-18 | End: 2025-07-18

## 2025-07-18 RX ORDER — OXYCODONE AND ACETAMINOPHEN 5; 325 MG/1; MG/1
1 TABLET ORAL EVERY 4 HOURS PRN
Qty: 15 TABLET | Refills: 0 | Status: SHIPPED | OUTPATIENT
Start: 2025-07-18 | End: 2025-07-28

## 2025-07-18 RX ORDER — OXYCODONE AND ACETAMINOPHEN 5; 325 MG/1; MG/1
1 TABLET ORAL EVERY 4 HOURS PRN
Refills: 0 | Status: DISCONTINUED | OUTPATIENT
Start: 2025-07-18 | End: 2025-07-18 | Stop reason: HOSPADM

## 2025-07-18 RX ORDER — HYDROMORPHONE HCL/PF 1 MG/ML
0.5 SYRINGE (ML) INJECTION
Status: COMPLETED | OUTPATIENT
Start: 2025-07-18 | End: 2025-07-18

## 2025-07-18 RX ORDER — CHLORHEXIDINE GLUCONATE 40 MG/ML
SOLUTION TOPICAL DAILY PRN
Status: DISCONTINUED | OUTPATIENT
Start: 2025-07-18 | End: 2025-07-18 | Stop reason: HOSPADM

## 2025-07-18 RX ORDER — KETOROLAC TROMETHAMINE 30 MG/ML
INJECTION, SOLUTION INTRAMUSCULAR; INTRAVENOUS AS NEEDED
Status: DISCONTINUED | OUTPATIENT
Start: 2025-07-18 | End: 2025-07-18

## 2025-07-18 RX ORDER — METOCLOPRAMIDE HYDROCHLORIDE 5 MG/ML
10 INJECTION INTRAMUSCULAR; INTRAVENOUS ONCE AS NEEDED
Status: DISCONTINUED | OUTPATIENT
Start: 2025-07-18 | End: 2025-07-18 | Stop reason: HOSPADM

## 2025-07-18 RX ADMIN — LIDOCAINE HYDROCHLORIDE 50 MG: 10 INJECTION, SOLUTION EPIDURAL; INFILTRATION; INTRACAUDAL; PERINEURAL at 08:15

## 2025-07-18 RX ADMIN — DIPHENHYDRAMINE HYDROCHLORIDE 25 MG: 50 INJECTION, SOLUTION INTRAMUSCULAR; INTRAVENOUS at 10:20

## 2025-07-18 RX ADMIN — ONDANSETRON 4 MG: 2 INJECTION, SOLUTION INTRAMUSCULAR; INTRAVENOUS at 09:04

## 2025-07-18 RX ADMIN — HYDROMORPHONE HYDROCHLORIDE 0.5 MG: 1 INJECTION, SOLUTION INTRAMUSCULAR; INTRAVENOUS; SUBCUTANEOUS at 09:07

## 2025-07-18 RX ADMIN — CLINDAMYCIN PHOSPHATE 900 MG: 900 INJECTION, SOLUTION INTRAVENOUS at 08:18

## 2025-07-18 RX ADMIN — HYDROMORPHONE HYDROCHLORIDE 0.5 MG: 1 INJECTION, SOLUTION INTRAMUSCULAR; INTRAVENOUS; SUBCUTANEOUS at 09:30

## 2025-07-18 RX ADMIN — HYDROMORPHONE HYDROCHLORIDE 0.5 MG: 1 INJECTION, SOLUTION INTRAMUSCULAR; INTRAVENOUS; SUBCUTANEOUS at 09:42

## 2025-07-18 RX ADMIN — OXYCODONE HYDROCHLORIDE AND ACETAMINOPHEN 1 TABLET: 5; 325 TABLET ORAL at 10:11

## 2025-07-18 RX ADMIN — KETOROLAC TROMETHAMINE 30 MG: 30 INJECTION, SOLUTION INTRAMUSCULAR; INTRAVENOUS at 09:03

## 2025-07-18 RX ADMIN — SODIUM CHLORIDE, SODIUM LACTATE, POTASSIUM CHLORIDE, AND CALCIUM CHLORIDE 100 ML/HR: .6; .31; .03; .02 INJECTION, SOLUTION INTRAVENOUS at 07:42

## 2025-07-18 RX ADMIN — FENTANYL CITRATE 50 MCG: 50 INJECTION INTRAMUSCULAR; INTRAVENOUS at 08:42

## 2025-07-18 RX ADMIN — MIDAZOLAM 2 MG: 1 INJECTION INTRAMUSCULAR; INTRAVENOUS at 08:04

## 2025-07-18 RX ADMIN — PROPOFOL 200 MG: 10 INJECTION, EMULSION INTRAVENOUS at 08:15

## 2025-07-18 RX ADMIN — PROPOFOL 80 MG: 10 INJECTION, EMULSION INTRAVENOUS at 08:16

## 2025-07-18 RX ADMIN — CHLORHEXIDINE GLUCONATE 15 ML: 1.2 SOLUTION ORAL at 07:43

## 2025-07-18 RX ADMIN — FENTANYL CITRATE 25 MCG: 50 INJECTION INTRAMUSCULAR; INTRAVENOUS at 08:15

## 2025-07-18 RX ADMIN — DEXAMETHASONE SODIUM PHOSPHATE 10 MG: 10 INJECTION, SOLUTION INTRAMUSCULAR; INTRAVENOUS at 08:16

## 2025-07-18 RX ADMIN — HYDROMORPHONE HYDROCHLORIDE 0.5 MG: 1 INJECTION, SOLUTION INTRAMUSCULAR; INTRAVENOUS; SUBCUTANEOUS at 08:44

## 2025-07-18 RX ADMIN — FENTANYL CITRATE 25 MCG: 50 INJECTION INTRAMUSCULAR; INTRAVENOUS at 08:21

## 2025-07-18 NOTE — DISCHARGE INSTR - AVS FIRST PAGE
POST-OPERATIVE KNEE INSTRUCTIONS - II    The principal surgical findings in your knee were:    1. Left knee bucket handle medial meniscus tear    2. Left knee anteromedial plica      The following corrective procedures were performed:     1. Arthroscopic anteromedial plica resection  2. Meniscal repair      FOLLOW-UP:     You will need an appointment in:     Please call the office at [phone] today.     GENERAL INSTRUCTIONS:     Apply an ice pack to your knee for the next 12-24 hours.    NO weight bearing through left leg with use of crutches. Brace is to stay on at all times except for hygiene. Brace allows for 30 degrees of flexion and that is it.    Take it easy for at least 24 hours. Do not drive for 3-5 days. You may move about, but stay around home or hotel.   If you have an upset stomach, take only cool, clear liquids, such as Gatorade, Jello, or Ginger ale. If nausea persists for more than 24 hours, notify my office.    Low grade temperature is not uncommon after surgery. However, if your temperature exceeds 101 degrees, please notify my office.   You should take 81 mg enteric-coated aspirin in the morning and one tablet at night to help minimize the chance of developing phlebitis (clots in the vein). This should also be taken with food or a glass of milk to avoid stomach upset. Examples of enteric-coated aspirin are Ecotrin, Ascriptin, Or Bufferin. DO NOT TAKE this if you are known to be allergic to it or have a prior history of stomach ulcer disease.  NOTE:  If, after taking the enteric coated aspirin, you develop any pain in the stomach, nausea, vomiting, or stomach irritation, you should stop the  medication immediately because it may cause stomach ulcers. Also, if you continue taking this medication while you are having pain in the stomach or nausea or stomach cramps, an ulcer could develop.         To reduce pain and swelling, place several pillows under your knee for the first 24 to 48 hours. The  knee should be elevated above the heart. Ice should be applied to the knee during this period and this will help decrease discomfort and swelling. Apply to the knee for 30 minutes every 2 hours.         Any prescription you received before surgery or after surgery should be filled immediately, and taken according to directions on the label. Taking the medicine with food or with a glass of milk will avoid stomach upset.   EXERCISES:      Begin doing gentle exercises right away. Exercising will reduce the swelling, increase motion, and prevent muscle weakness. The following exercises should be performed during the first week and a half, following surgery.   The advanced knee exercise program will be started when you are seen in the office.       1. QUAD SETS: Straighten as straight as possible and then clench the thigh muscles tightly. Keep the muscles clenched tightly to the slow count of 3 then relax. Repeat this exercise 10-30 times every hour.    2. STRAIGHT LEG RAISING: With the knee held straight and the quadriceps muscle contracted, raise your leg up 6 to 8 inches and hold it to the slow count of 3. Repeat this exercise 10-30 times every hour.          For the first 48 hours inhale deeply and hold your breath for 3 seconds; exhale completely. Repeat 10 times, 4 times daily.    If you smoke, avoid cigarettes for 48 hours.     BANDAGES:      Your bandage may show blood stains within 1-12 hours. This is mostly fluid that was used to irrigate your knee, slightly tinged with blood. It is no cause for concern. However, if your bandage becomes saturated, notify my office right away.         You may remove the bulky dressings in 2 days and applied band aids to the small skin incisions. You may shower in 3 days after surgery.     WORK:   You can resume work when you are comfortable. (This can be a week or more depending on the type of work you do). Do not walk or stand for excessive periods. Do not operate heavy  machinery that requires pedals.     IMPORTANT:    Report any complications to my office immediately. This includes excessive bleeding, would breakdown, or sighs of infection, calf or ankle swelling, excessive pain or fever over 101 degrees. Also, call if you notice the extremity becoming cold, blue, or numb.      Eat a balanced diet and get plenty of rest.

## 2025-07-18 NOTE — ANESTHESIA POSTPROCEDURE EVALUATION
Post-Op Assessment Note    CV Status:  Stable    Pain management: adequate       Mental Status:  Alert and awake   Hydration Status:  Euvolemic   PONV Controlled:  Controlled   Airway Patency:  Patent     Post Op Vitals Reviewed: Yes    No anethesia notable event occurred.    Staff: Anesthesiologist         Last Filed PACU Vitals:  Vitals Value Taken Time   Temp 97.3 °F (36.3 °C) 07/18/25 10:11   Pulse 64 07/18/25 11:08   /82 07/18/25 10:42   Resp 51 07/18/25 11:08   SpO2 98 % 07/18/25 11:08   Vitals shown include unfiled device data.    Modified Marisela:     Vitals Value Taken Time   Activity 2 07/18/25 09:42   Respiration 2 07/18/25 09:42   Circulation 2 07/18/25 09:42   Consciousness 2 07/18/25 09:42   Oxygen Saturation 2 07/18/25 09:42     Modified Marisela Score: 10

## 2025-07-18 NOTE — ANESTHESIA POSTPROCEDURE EVALUATION
Post-Op Assessment Note    CV Status:  Stable    Pain management: adequate       Mental Status:  Alert and awake   Hydration Status:  Euvolemic   PONV Controlled:  Controlled   Airway Patency:  Patent     Post Op Vitals Reviewed: Yes    No anethesia notable event occurred.    Staff: CRNA           Last Filed PACU Vitals:  Vitals Value Taken Time   Temp 97.3    Pulse 68 07/18/25 09:23   /93 07/18/25 09:21   Resp 11 07/18/25 09:23   SpO2 100 % 07/18/25 09:23   Vitals shown include unfiled device data.

## 2025-07-18 NOTE — OP NOTE
OPERATIVE REPORT  PATIENT NAME: Gabriel Juarez    :  1980  MRN: [id number]  Pt Location: [facility] OR ROOM 02    SURGERY DATE: 2025    Surgeons and Role:     * Ana Rodriguez MD - Primary     * Violet Manzanares PA-C - Assisting    Preop Diagnosis:  Bucket-handle tear of medial meniscus of left knee as current injury, initial encounter [S83.212A]    Post-Op Diagnosis Codes:     * Bucket-handle tear of medial meniscus of left knee as current injury, initial encounter [S83.212A]     * Anteromedial plica    Procedure(s):  Left - ARTHROSCOPY KNEE. LEFT MEDIAL BUCKET-HANDLE MENISCUS REPAIR, RESECTION OF ANTEROMEDIAL PLICA    Specimen(s):  * No specimens in log *    Estimated Blood Loss:   Minimal    Drains:  * No LDAs found *    Anesthesia Type:   Choice    Operative Indications:  Bucket-handle tear of medial meniscus of left knee as current injury, initial encounter [S83.212A]    Implant Name Type Inv. Item Serial No.  Lot No. LRB No. Used Action   DEVICE FIXATION FAST- D CURVE 2 ANCHOR PRETIE SUTURE MENISCAL REPAIR SYSTEM - [id number]  DEVICE FIXATION FAST- D CURVE 2 ANCHOR PRETIE SUTURE MENISCAL REPAIR SYSTEM  [employer] [id number] Left 1 Implanted   KIT SURGICAL INSTRUMENT TFCC FAST-FIX - [id number]  KIT SURGICAL INSTRUMENT TFCC FAST-FIX  [employer]  Left 2 Implanted   KIT ARTHROSCOPIC REYNOLDS CANNULA SET CURVE  NEEDLE FAST-FIX FLEX - [id number]  KIT ARTHROSCOPIC REYNOLDS CANNULA SET CURVE  NEEDLE FAST-FIX FLEX  [employer]  Left 1 Implanted   DEVICE FIXATION FAST- D CURVE 2 ANCHOR PRETIE SUTURE MENISCAL REPAIR SYSTEM - [id number]  DEVICE FIXATION FAST- D CURVE 2 ANCHOR PRETIE SUTURE MENISCAL REPAIR SYSTEM  [employer] [id number] Left 1 Implanted     Indications: Gabriel Juarez is a 45 y.o. years old male diagnosed with left medial meniscus bucket-handle tear. Patient failed conservative treatments and elected to proceed  with surgical intervention. The risks and complications are discussed with the patient. The patient consented to the procedure.    Procedure: Patient was brought into the OR and placed in supine position. Patient was anesthetized and intubated with LMA without any complications. Patient's left knee was prep and draped in sterile fashion with tourniquet in the upper thigh. A time out was call and identified the left knee was the operating site. 3 portals were utilized for instrumentation.  Each portal was injected with 1-2 cc of Marcaine 0.5% with epinephrine.  A superior-lateral protal was use for the inflow which is set to 30 mmHg. The scope was introduced into the knee from the lateral portal. Inspection of the knee was carried out in counter clockwise fashion. A medial protal was also created for instrumentation. Patellofemoral joint showed grade 2 degenerative changes. Anterior medial plica was present and thickened. Medial compartment showed grade 2 degenerative changes. Medial meniscus was torn as bucket-handle and displaced anterior laterally.  Tear from posterior root to mid-body.  ACL and PCL were intact. Lateral compartment showed grade 0 degenerative changes. Lateral meniscus was intact.    Attention was turn to medial compartment.  The torn part of the meniscus was debrided to create a bleeding bed.  The meniscus was then reduced.  2 [employer] FasT-Fix meniscus repair kit was used to stabilize the meniscus to the posterior horn and mid body.  The fixation appeared to be stable.  Knee range of motion was carried out to 90 degree and the meniscus appeared to be stable.  Probe was also used to assess the stability of the repair again we were not able to pull the meniscus forward.  Anteromedial plica was resected using a shaver.  Excess fluid was drain out of the knee.  All counts were correct.    The incisions were closed with Nylon. A sterile bulky dressing was applied. The patient tolerated the  46 yo male with IVDU, recent admission OSH 12/11-16 for RLE cellulitis/osteomyelitis (wound cx 12/14 MSSA); he was discharged on clindamycin, then switched to doxycycline (to which he was not adherent).   - f/u bcx  - repeat MRI R foot/ankle as per podiatry  - given finding of R ankle effusion, may need to pursue diagnostic arthrocentesis to r/o septic arthritis  - given confirmed MSSA infection, d/c vancomycin and cefepime and start cefazolin 2g IV q8h; should patient opt to leave AMA, can be transitioned to PO cephalexin  - HIV and viral hepatitis screen    Dr. Triplett to assume care Thursday 1/7    ID Team 2 procedure well without any complications. Patient was then extubated and transferred to recovery room for post-op care. The family was contacted.    There was no qualified resident available to assist.    Mrs. Manzanares was required in the OR in helping performing the minimal invasive arthroscopic techniques in meniscectomy by manipulating the knee and clearing the surgical field for better visualization, as well as assisting in control the camera and repair the meniscus.    Complications:   None    Patient Disposition:  PACU      SIGNATURE: Ana Rodriguez MD  DATE: July 18, 2025  TIME: 9:15 AM

## 2025-07-18 NOTE — INTERVAL H&P NOTE
H&P reviewed. After examining the patient I find no changes in the patients condition since the H&P had been written.    Vitals:    07/18/25 0725   BP: 134/79   Pulse: 70   Resp: 16   Temp: (!) 97.1 °F (36.2 °C)   SpO2: 98%

## 2025-07-18 NOTE — ANESTHESIA PREPROCEDURE EVALUATION
Procedure:  ARTHROSCOPY KNEE, LEFT PARTIAL MEDIAL MENISCECTOMY VS REPAIR (Left: Knee)    Relevant Problems   NEURO/PSYCH   (+) Current mild episode of major depressive disorder (HCC)        Physical Exam    Airway     Mallampati score: II  TM Distance: >3 FB  Neck ROM: full  Mouth opening: >= 4 cm      Cardiovascular      Dental   No notable dental hx     Pulmonary      Neurological  - normal exam    Other Findings        Anesthesia Plan  ASA Score- 2     Anesthesia Type- general with ASA Monitors.         Additional Monitors:     Airway Plan: LMA and LMA.           Plan Factors-    Chart reviewed. EKG reviewed.  Existing labs reviewed. Patient summary reviewed.    Patient is a current smoker.              Induction-     Postoperative Plan- Plan for postoperative opioid use.   Monitoring Plan - Monitoring plan - standard ASA monitoring  Post Operative Pain Plan - plan for postoperative opioid use        Informed Consent- Anesthetic plan and risks discussed with patient and spouse.  I personally reviewed this patient with the CRNA. Discussed and agreed on the Anesthesia Plan with the CRNA..      NPO Status:  Vitals Value Taken Time   Date of last liquid 07/17/25 07/18/25 07:13   Time of last liquid 0000 07/18/25 07:13   Date of last solid 07/17/25 07/18/25 07:13   Time of last solid 0000 07/18/25 07:13

## 2025-07-21 ENCOUNTER — TELEPHONE (OUTPATIENT)
Age: 45
End: 2025-07-21

## 2025-07-21 NOTE — TELEPHONE ENCOUNTER
Caller: Yessica/spouse    Doctor/Office: Dr Rodriguez    Call regarding :  patient is experiencing constant pain. 8-9/10. struggling to ambulate     Call was transferred to: AM

## 2025-07-21 NOTE — TELEPHONE ENCOUNTER
Spoke to patient's wife. He is struggling with pain (gets to 8/10 at times). Currently at rest it is a 3/10. He is really struggling with trying to get around. He is using a walker. He is taking percocet every 6 hours, as he is getting low on these so is trying to not to take them every 4 hours. He is also take 400 mg of advil intermittently. He is also icing hte knee. I advised that he can take 600 mg advil every 8 hours. I advise to try this on a scheduled basis for a few days. He can also supplement extra tylenol ( not to exceed 3000 mg per day) they are aware that each percocet has 325 mg. 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 He should ice 20 min on and 20 min off. He denies any redness, fever/chills, or calf pain/cramping. Notes good sensation of the lower extremity and is able to move foot and ankle well. I advised the first few days are the most difficulty  post-op and that his symptoms should hopefully start improving over the next few days. They will call back in no improvement or worsening of symptoms.

## 2025-07-31 VITALS — HEIGHT: 74 IN | WEIGHT: 183 LBS | BODY MASS INDEX: 23.49 KG/M2

## 2025-07-31 DIAGNOSIS — Z47.89 AFTERCARE FOLLOWING SURGERY OF THE MUSCULOSKELETAL SYSTEM: Primary | ICD-10-CM

## 2025-07-31 PROCEDURE — 99024 POSTOP FOLLOW-UP VISIT: CPT | Performed by: ORTHOPAEDIC SURGERY

## 2025-08-07 ENCOUNTER — OFFICE VISIT (OUTPATIENT)
Dept: FAMILY MEDICINE CLINIC | Facility: CLINIC | Age: 45
End: 2025-08-07
Payer: COMMERCIAL

## 2025-08-07 VITALS
HEART RATE: 83 BPM | HEIGHT: 74 IN | WEIGHT: 185 LBS | OXYGEN SATURATION: 98 % | TEMPERATURE: 97.3 F | BODY MASS INDEX: 23.74 KG/M2 | SYSTOLIC BLOOD PRESSURE: 126 MMHG | DIASTOLIC BLOOD PRESSURE: 78 MMHG

## 2025-08-07 DIAGNOSIS — Z00.00 WELLNESS EXAMINATION: ICD-10-CM

## 2025-08-07 DIAGNOSIS — F32.0 CURRENT MILD EPISODE OF MAJOR DEPRESSIVE DISORDER WITHOUT PRIOR EPISODE (HCC): ICD-10-CM

## 2025-08-07 DIAGNOSIS — F90.2 ATTENTION DEFICIT HYPERACTIVITY DISORDER (ADHD), COMBINED TYPE: ICD-10-CM

## 2025-08-07 DIAGNOSIS — F51.01 PRIMARY INSOMNIA: ICD-10-CM

## 2025-08-07 DIAGNOSIS — R53.83 OTHER FATIGUE: ICD-10-CM

## 2025-08-07 DIAGNOSIS — M81.8 OTHER OSTEOPOROSIS WITHOUT CURRENT PATHOLOGICAL FRACTURE: Primary | ICD-10-CM

## 2025-08-07 PROCEDURE — 99396 PREV VISIT EST AGE 40-64: CPT | Performed by: FAMILY MEDICINE

## 2025-08-07 RX ORDER — DEXTROAMPHETAMINE SACCHARATE, AMPHETAMINE ASPARTATE, DEXTROAMPHETAMINE SULFATE AND AMPHETAMINE SULFATE 2.5; 2.5; 2.5; 2.5 MG/1; MG/1; MG/1; MG/1
10 TABLET ORAL
Qty: 60 TABLET | Refills: 0 | Status: SHIPPED | OUTPATIENT
Start: 2025-08-07

## 2025-08-07 RX ORDER — LORAZEPAM 1 MG/1
1 TABLET ORAL EVERY 8 HOURS PRN
Qty: 60 TABLET | Refills: 5 | Status: SHIPPED | OUTPATIENT
Start: 2025-08-07

## 2025-08-07 RX ORDER — FOLIC ACID 1 MG/1
1 TABLET ORAL DAILY
Qty: 90 TABLET | Refills: 3 | Status: SHIPPED | OUTPATIENT
Start: 2025-08-07

## 2025-08-07 RX ORDER — BUPROPION HYDROCHLORIDE 150 MG/1
150 TABLET ORAL EVERY MORNING
Qty: 90 TABLET | Refills: 3 | Status: SHIPPED | OUTPATIENT
Start: 2025-08-07 | End: 2026-08-02

## 2025-08-07 RX ORDER — HYDROXYZINE PAMOATE 100 MG
100 CAPSULE ORAL 3 TIMES DAILY PRN
Qty: 90 CAPSULE | Refills: 1 | Status: SHIPPED | OUTPATIENT
Start: 2025-08-07

## 2025-08-11 ENCOUNTER — PATIENT MESSAGE (OUTPATIENT)
Dept: OBGYN CLINIC | Facility: CLINIC | Age: 45
End: 2025-08-11

## 2025-08-11 LAB

## (undated) DEVICE — STERILE BETHLEHEM T AND A PACK: Brand: CARDINAL HEALTH

## (undated) DEVICE — Device

## (undated) DEVICE — ANTI-FOG SOLUTION WITH FOAM PAD: Brand: DEVON

## (undated) DEVICE — CATH URET 12FR RED RUBBER

## (undated) DEVICE — SUCTION BOVIE ENT

## (undated) DEVICE — PAD GROUNDING DUAL ADULT

## (undated) DEVICE — CUFF TOURNIQUET 30 X 4 IN QUICK CONNECT DISP 1BLA

## (undated) DEVICE — GLOVE SRG BIOGEL 7.5

## (undated) DEVICE — PACK PBDS UNIVERSAL ARTHROSCOPY RF

## (undated) DEVICE — ELECTRODE BLADE MOD E-Z CLEAN 2.5IN 6.4CM -0012M

## (undated) DEVICE — SYRINGE BULB 2 OZ